# Patient Record
Sex: FEMALE | HISPANIC OR LATINO | Employment: UNEMPLOYED | URBAN - METROPOLITAN AREA
[De-identification: names, ages, dates, MRNs, and addresses within clinical notes are randomized per-mention and may not be internally consistent; named-entity substitution may affect disease eponyms.]

---

## 2017-02-16 PROBLEM — M54.2 NECK PAIN: Status: ACTIVE | Noted: 2017-02-16

## 2017-07-07 ENCOUNTER — OFFICE VISIT (OUTPATIENT)
Dept: OBSTETRICS AND GYNECOLOGY | Facility: CLINIC | Age: 57
End: 2017-07-07
Payer: MEDICAID

## 2017-07-07 VITALS — BODY MASS INDEX: 24.4 KG/M2 | DIASTOLIC BLOOD PRESSURE: 84 MMHG | WEIGHT: 146.63 LBS | SYSTOLIC BLOOD PRESSURE: 127 MMHG

## 2017-07-07 DIAGNOSIS — R31.9 HEMATURIA: Primary | ICD-10-CM

## 2017-07-07 PROCEDURE — 99213 OFFICE O/P EST LOW 20 MIN: CPT | Mod: PBBFAC,PO | Performed by: OBSTETRICS & GYNECOLOGY

## 2017-07-07 PROCEDURE — 88175 CYTOPATH C/V AUTO FLUID REDO: CPT

## 2017-07-07 PROCEDURE — 99999 PR PBB SHADOW E&M-EST. PATIENT-LVL III: CPT | Mod: PBBFAC,,, | Performed by: OBSTETRICS & GYNECOLOGY

## 2017-07-07 PROCEDURE — 87086 URINE CULTURE/COLONY COUNT: CPT

## 2017-07-07 PROCEDURE — 81003 URINALYSIS AUTO W/O SCOPE: CPT

## 2017-07-07 PROCEDURE — 99203 OFFICE O/P NEW LOW 30 MIN: CPT | Mod: S$GLB,,, | Performed by: OBSTETRICS & GYNECOLOGY

## 2017-07-08 LAB — BACTERIA UR CULT: NO GROWTH

## 2017-07-09 ENCOUNTER — TELEPHONE (OUTPATIENT)
Dept: OBSTETRICS AND GYNECOLOGY | Facility: CLINIC | Age: 57
End: 2017-07-09

## 2017-07-10 LAB
BILIRUB UR QL STRIP: NEGATIVE
CLARITY UR REFRACT.AUTO: CLEAR
COLOR UR AUTO: ABNORMAL
GLUCOSE UR QL STRIP: NEGATIVE
HGB UR QL STRIP: ABNORMAL
KETONES UR QL STRIP: NEGATIVE
LEUKOCYTE ESTERASE UR QL STRIP: NEGATIVE
NITRITE UR QL STRIP: NEGATIVE
PH UR STRIP: 5 [PH] (ref 5–8)
PROT UR QL STRIP: NEGATIVE
SP GR UR STRIP: 1.01 (ref 1–1.03)
URN SPEC COLLECT METH UR: ABNORMAL
UROBILINOGEN UR STRIP-ACNC: NEGATIVE EU/DL

## 2017-07-11 NOTE — PROGRESS NOTES
GYNECOLOGY  :  Lisa Do is a 56 y.o.No obstetric history on file.    Last visit to Gyn about 5 years ago   Here today for  Episode of bleeding during urination. Urine was bloody with clots , only one episode one month ago   No Pain or cramping  Unsure if was vaginal or from urinary tract  Hx of colon cancer in 2013 , s/p hemicolectomy and    Hx Hysterectomy x uterine fibroids.   Refers severe constipation , needs to strain to defecate              Past Medical History:   Diagnosis Date    Cancer 2013    Colon    GERD (gastroesophageal reflux disease)     Hypertension      Past Surgical History:   Procedure Laterality Date    APPENDECTOMY      COLON SURGERY  2013    Partial colectomy    HERNIA REPAIR      HYSTERECTOMY       Family History   Problem Relation Age of Onset    Hypertension Mother     Hypertension Sister     Cancer Sister      Brain    Cancer Sister      Breast     Social History   Substance Use Topics    Smoking status: Never Smoker    Smokeless tobacco: Never Used    Alcohol use No     OB History   No data available       /84   Wt 66.5 kg (146 lb 9.7 oz)   LMP 06/06/2017 (LMP Unknown)   BMI 24.40 kg/m²     Last PAP=s/p hyst  LMP =s/p hyst   Last Mammogram = n/a  Last Colonoscopy  =5 years ago       COMPREHENSIVE GYN HISTORY:       PAP History: Denies abnormal Paps.  Infection History: Denies STDs. Denies PID.      ROS  GENERAL: Denies significant weight gain or weight loss. Feeling well overall.  SKIN: Denies rash or lesions.  Normal skin turgor  HEAD: Denies head injury or headache.   NODES: Denies enlarged lymph nodes.   CHEST: Denies chest pain or shortness of breath.   CARDIOVASCULAR: Denies palpitations or left sided chest pain.   ABDOMEN: No abdominal pain,no  diarrhea,  nausea, vomiting or rectal bleeding. Constipation (+)  URINARY: normal  Frequency,no  Dysuria or burning on urination.   REPRODUCTIVE: Per HPI   BREASTS: The patient sometimes performs breast  self-examination and denies pain, lumps, or nipple discharge.   HEMATOLOGIC: No easy bruisability or excessive bleeding.   MUSCULOSKELETAL: Denies joint pain or swelling.   NEUROLOGIC: Denies syncope or weakness.   PSYCHIATRIC: Denies depression, anxiety or mood swings.    Physical Exam     Constitutional: She is oriented to person, place, and time. She appears well-developed and well-nourished. No distress.   HENT:   Head: Normocephalic.   NECK: Neck symmetric without masses or thyromegaly.  Cardiovascular: Normal rate and regular rhythm.   Pulmonary/Chest: Effort normal and breath sounds normal. No respiratory distress. She has no wheezes.   Breasts: Symmetrical, no skin changes or visible lesions. No palpable masses, nipple discharge or adenopathy bilaterally.  Abdominal: Soft not distended. Bowel sounds are normal. She exhibits   no masses . No tenderness to palpation.   Genitourinary: Pelvic exam was performed with patient supine.   External genitalia normal no lesions.Normal hair distribution   Adequate perineal body,normal urethral meatus   Vagina moist and well rugated without lesions, no vaginal  Discharge.  It appears to be a cervix  Visible in the vaginal vault, is flat,, structure resembles cervix   Bimanual exam is difficult to perform, abdomen is bloated, and patient does not help by pushing   Extremities normal no cyanosis ,edema.     Procedures:  Pap smear  TVUS   UA  Urine culture     A/P Lisa Hi 56 y.o. No obstetric history on file.    Dx=  1- Genital bleeding      Urethral caruncle   2- s/p hysterectomy ( supracervical ?)   3- hx colon cancer     Plan:  At this time no source of bleeding seen  from GYN tract.   We need to evaluate both Urinary tract and GI  With the hx of colon cancer and constipation, gastro evaluation is warranted  Urology consult placed for evaluation x possible cysto   Will follow imaging and cultures             Patient was counseled today on A.C.S. Pap guidelines  and recommendations for yearly pelvic exams, mammograms and monthly self breast exams; to see her PCP for other health maintenance, nutrition and weight gain counseling, discussed lab values.  Discussed colonoscopy recommendations every 10 years starting at age 50   Calcium and vit D daily intake     F/u in 1m to review labs and FU     Josiah Neely M.D.   OB/GYN

## 2017-07-16 ENCOUNTER — TELEPHONE (OUTPATIENT)
Dept: OBSTETRICS AND GYNECOLOGY | Facility: CLINIC | Age: 57
End: 2017-07-16

## 2017-07-17 NOTE — TELEPHONE ENCOUNTER
Uranalysis shows  Occult blood   Be sure patient has an appointment with urology   As recommended during the visit     Josiah Neely M.D.   OB/GYN    7/16/2017

## 2017-07-17 NOTE — TELEPHONE ENCOUNTER
Unable to reach pt. To tell her to f/u with urologists as discussed at her last visit with dr. Neely, her urine is showing blood.

## 2017-07-25 ENCOUNTER — OFFICE VISIT (OUTPATIENT)
Dept: UROLOGY | Facility: CLINIC | Age: 57
End: 2017-07-25
Payer: COMMERCIAL

## 2017-07-25 VITALS
DIASTOLIC BLOOD PRESSURE: 79 MMHG | HEART RATE: 78 BPM | WEIGHT: 146 LBS | BODY MASS INDEX: 24.32 KG/M2 | SYSTOLIC BLOOD PRESSURE: 117 MMHG | HEIGHT: 65 IN

## 2017-07-25 DIAGNOSIS — R31.0 GROSS HEMATURIA: Primary | ICD-10-CM

## 2017-07-25 DIAGNOSIS — N36.8 URETHRAL PROLAPSE: ICD-10-CM

## 2017-07-25 LAB
BACTERIA #/AREA URNS AUTO: NORMAL /HPF
BILIRUB SERPL-MCNC: NORMAL MG/DL
BLOOD URINE, POC: NORMAL
COLOR, POC UA: YELLOW
GLUCOSE UR QL STRIP: NORMAL
KETONES UR QL STRIP: NORMAL
LEUKOCYTE ESTERASE URINE, POC: NORMAL
MICROSCOPIC COMMENT: NORMAL
NITRITE, POC UA: NORMAL
PH, POC UA: 7
PROTEIN, POC: NORMAL
RBC #/AREA URNS AUTO: 0 /HPF (ref 0–4)
SPECIFIC GRAVITY, POC UA: 1
UROBILINOGEN, POC UA: NORMAL
WBC #/AREA URNS AUTO: 0 /HPF (ref 0–5)

## 2017-07-25 PROCEDURE — 87086 URINE CULTURE/COLONY COUNT: CPT

## 2017-07-25 PROCEDURE — 99204 OFFICE O/P NEW MOD 45 MIN: CPT | Mod: 25,S$GLB,, | Performed by: UROLOGY

## 2017-07-25 PROCEDURE — 81001 URINALYSIS AUTO W/SCOPE: CPT

## 2017-07-25 PROCEDURE — 99999 PR PBB SHADOW E&M-EST. PATIENT-LVL III: CPT | Mod: PBBFAC,,, | Performed by: UROLOGY

## 2017-07-25 PROCEDURE — 99213 OFFICE O/P EST LOW 20 MIN: CPT | Mod: PBBFAC,PO | Performed by: UROLOGY

## 2017-07-25 PROCEDURE — 81001 URINALYSIS AUTO W/SCOPE: CPT | Mod: S$GLB,,, | Performed by: UROLOGY

## 2017-07-25 RX ORDER — DOCUSATE SODIUM 100 MG/1
100 CAPSULE, LIQUID FILLED ORAL
COMMUNITY
Start: 2014-05-13 | End: 2017-07-25

## 2017-07-25 NOTE — PROGRESS NOTES
HPI:  Lisa Do is a 57 y.o. year old female that  presents with No chief complaint on file.  .  This patient referred by her gynecologist for gross hematuria.  No associated flank pain nausea vomiting or dysuria.  She gives no history of kidney stones.  This bleeding has since resolved.    The patient does have a history of colon cancer status post colon surgery in 2012.  She states that this is in remission and she is sure that the blood was from her urine and not from her rectum    Patient speaks minimal English and communication is done with the help of her son who translates.    She has never had urologic surgery and there is no family history of urologic cancer.  Graft chart review shows no from GYN this month showing gross hematuria with negative urine culture.  Physical exam showed urethral caruncle.  Vaginal exam showed no masses.  In January of this year GFR was 98.      Past Medical History:   Diagnosis Date    Cancer 2013    Colon    GERD (gastroesophageal reflux disease)     Hypertension      Social History     Social History    Marital status:      Spouse name: N/A    Number of children: N/A    Years of education: N/A     Occupational History    Not on file.     Social History Main Topics    Smoking status: Never Smoker    Smokeless tobacco: Never Used    Alcohol use No    Drug use: No    Sexual activity: Yes     Partners: Male     Other Topics Concern    Not on file     Social History Narrative    No narrative on file     Past Surgical History:   Procedure Laterality Date    APPENDECTOMY      COLON SURGERY  2013    Partial colectomy    HERNIA REPAIR      HYSTERECTOMY       Family History   Problem Relation Age of Onset    Hypertension Mother     Hypertension Sister     Cancer Sister      Brain    Cancer Sister      Breast           Review of Systems  The patient has no chest pains.  The patient has no shortness of breath  Patient wears        glasses.  All other  "review of systems are negative.      Physical Exam:  /79   Pulse 78   Ht 5' 5" (1.651 m)   Wt 66.2 kg (146 lb)   LMP 06/06/2017 (LMP Unknown)   BMI 24.30 kg/m²   General appearance: alert, cooperative, no distress  Constitutional:Oriented to person, place, and time.appears well-developed and well-nourished.   HEENT: Normocephalic, atraumatic, neck symmetrical, no nasal discharge   Eyes: conjunctivae/corneas clear, PERRL, EOM's intact  Lungs: clear to auscultation bilaterally, no dullness to percussion bilaterally  Heart: regular rate and rhythm without rub; no displacement of the PMI   Abdomen: soft, non-tender; bowel sounds normoactive; no organomegaly  :Urethral meatus without lesion, no urethral masses noted, minimal urethral prolapse, bladder nontender/nondistended, vagina normal appearing,   no      cystocele, anus/perineum without lesions, uterus surgically absent  Extremities: extremities symmetric; no clubbing, cyanosis, or edema  Integument: Skin color, texture, turgor normal; no rashes; hair distrubution normal  Neurologic: Alert and oriented X 3, normal strength, normal coordination and gait  Psychiatric: no pressured speech; normal affect; no evidence of impaired cognition     LABS:    Complete Blood Count  Lab Results   Component Value Date    RBC 5.07 04/27/2016    HGB 13.8 04/27/2016    HCT 43.2 04/27/2016    MCV 85.2 04/27/2016    MCH 27.3 04/27/2016    MCHC 32.0 04/27/2016    RDW 13.5 04/27/2016     04/27/2016    MPV 7.9 04/27/2016    LYMPH 1,903 04/27/2016    LYMPH 34.6 04/27/2016    MONO 330 04/27/2016    MONO 6.0 04/27/2016     04/27/2016    BASO 28 04/27/2016    EOSINOPHIL 2.4 04/27/2016    BASOPHIL 0.5 04/27/2016       Comprehensive Metabolic Panel  Lab Results   Component Value Date    GLU 97 02/14/2017    BUN 11 02/14/2017    CREATININE 0.66 02/14/2017     02/14/2017    K 3.9 02/14/2017    CL 98 02/14/2017    PROT 8.1 02/14/2017    ALBUMIN 4.8 02/14/2017    " BILITOT 0.5 02/14/2017    AST 24 02/14/2017    ALKPHOS 96 02/14/2017    CO2 27 02/14/2017    ALT 17 02/14/2017    EGFRNONAA 98 02/14/2017    ESTGFRAFRICA 117 04/27/2016       PSA  No results found for: PSA      Assessment:    ICD-10-CM ICD-9-CM    1. Gross hematuria R31.0 599.71 Urine culture      Urinalysis Microscopic      POCT urinalysis, dipstick or tablet reag      CT Urogram Abd Pelvis W WO      Cystoscopy   2. Urethral prolapse N36.8 599.5      The primary encounter diagnosis was Gross hematuria. A diagnosis of Urethral prolapse was also pertinent to this visit.      Plan: 1.  Gross hematuria.Plan.  I discussed at length in detail with the patient the need to evaluate blood in the urine.  I discussed that this is being done to rule out the presence of urothelial cancer.  I discussed the need for both imaging of the upper tracts and also cystoscopy.  Patient voices understanding and agrees.  We'll obtain CT urogram and subsequent cystoscopy    #2.  Urethral prolapse, mild.  Plan.  No therapy needed  Orders Placed This Encounter   Procedures    Cystoscopy    Urine culture    CT Urogram Abd Pelvis W WO    Urinalysis Microscopic    POCT urinalysis, dipstick or tablet reag           Dariusz Denney MD    PLEASE NOTE:  Please be advised that portions of this note were dictated using voice recognition software and may contain dictation related errors in spelling/grammar/appropriate pronouns/syntax or other errors that might have not been found and or corrected on text review.

## 2017-07-25 NOTE — LETTER
July 25, 2017      Josiah Neely MD  [address]  [place] [address]           [facility]  [address]  [place] [address]  Phone: [phone]          Patient: Lisa Do   MR Number: [id number]   YOB: 1960   Date of Visit: 7/25/2017       Dear Dr. Josiah Neely:    Thank you for referring Lisa Do to me for evaluation. Attached you will find relevant portions of my assessment and plan of care.    If you have questions, please do not hesitate to call me. I look forward to following Lisa Do along with you.    Sincerely,    Dariusz Denney MD    Enclosure  CC:  No Recipients    If you would like to receive this communication electronically, please contact [email or web address] or [phone] to request more information on [employer] access.    For providers and/or their staff who would like to refer a patient to [facility], please contact us through our one-stop-shop provider referral line, [facility], at [phone].    If you feel you have received this communication in error or would no longer like to receive these types of communications, please e-mail [email or web address]

## 2017-07-27 LAB
BACTERIA UR CULT: NORMAL
BACTERIA UR CULT: NORMAL

## 2017-08-08 ENCOUNTER — TELEPHONE (OUTPATIENT)
Dept: UROLOGY | Facility: CLINIC | Age: 57
End: 2017-08-08

## 2017-08-08 NOTE — TELEPHONE ENCOUNTER
----- Message from Cem Baltazar sent at 8/8/2017  8:42 AM CDT -----  Contact: [phone]/self   Pt need clarification on her upcoming appointments.  Please advise

## 2017-08-16 ENCOUNTER — PROCEDURE VISIT (OUTPATIENT)
Dept: UROLOGY | Facility: CLINIC | Age: 57
End: 2017-08-16
Payer: MEDICAID

## 2017-08-16 ENCOUNTER — HOSPITAL ENCOUNTER (OUTPATIENT)
Dept: RADIOLOGY | Facility: HOSPITAL | Age: 57
Discharge: HOME | End: 2017-08-16
Attending: UROLOGY
Payer: MEDICAID

## 2017-08-16 VITALS
DIASTOLIC BLOOD PRESSURE: 94 MMHG | WEIGHT: 146 LBS | HEART RATE: 82 BPM | SYSTOLIC BLOOD PRESSURE: 144 MMHG | HEIGHT: 65 IN | BODY MASS INDEX: 24.32 KG/M2

## 2017-08-16 DIAGNOSIS — R31.0 GROSS HEMATURIA: ICD-10-CM

## 2017-08-16 DIAGNOSIS — N28.1 RENAL CYST: ICD-10-CM

## 2017-08-16 DIAGNOSIS — R31.0 GROSS HEMATURIA: Primary | ICD-10-CM

## 2017-08-16 DIAGNOSIS — I10 ESSENTIAL HYPERTENSION, BENIGN: ICD-10-CM

## 2017-08-16 LAB
BILIRUB SERPL-MCNC: NORMAL MG/DL
BLOOD URINE, POC: NORMAL
COLOR, POC UA: NORMAL
GLUCOSE UR QL STRIP: NORMAL
KETONES UR QL STRIP: NORMAL
LEUKOCYTE ESTERASE URINE, POC: NORMAL
NITRITE, POC UA: NORMAL
PH, POC UA: 7
PROTEIN, POC: NORMAL
SPECIFIC GRAVITY, POC UA: 1
UROBILINOGEN, POC UA: NORMAL

## 2017-08-16 PROCEDURE — 74178 CT ABD&PLV WO CNTR FLWD CNTR: CPT | Mod: 26,,, | Performed by: RADIOLOGY

## 2017-08-16 PROCEDURE — 25500020 PHARM REV CODE 255: Performed by: UROLOGY

## 2017-08-16 PROCEDURE — 74178 CT ABD&PLV WO CNTR FLWD CNTR: CPT | Mod: TC

## 2017-08-16 PROCEDURE — 81001 URINALYSIS AUTO W/SCOPE: CPT | Mod: PBBFAC,PO | Performed by: UROLOGY

## 2017-08-16 PROCEDURE — 52000 CYSTOURETHROSCOPY: CPT | Mod: PBBFAC,PO | Performed by: UROLOGY

## 2017-08-16 PROCEDURE — 99213 OFFICE O/P EST LOW 20 MIN: CPT | Mod: S$PBB,25,, | Performed by: UROLOGY

## 2017-08-16 PROCEDURE — 52000 CYSTOURETHROSCOPY: CPT | Mod: S$PBB,,, | Performed by: UROLOGY

## 2017-08-16 RX ADMIN — IOHEXOL 125 ML: 350 INJECTION, SOLUTION INTRAVENOUS at 08:08

## 2017-08-17 NOTE — PROCEDURES
Procedures PLEASE NOTE:  Please be advised that portions of this note were dictated using voice recognition software and may contain dictation related errors in spelling/grammar/appropriate pronouns/syntax or other errors that might have not been found and or corrected on text review.      Procedures: Flexible cystourethroscopy    Pre Procedure Diagnosis: Gross hematuria    Post Procedure Diagnosis: Same of presumed benign etiology    Date of Procedure. 08/16/2017    Indications.  Female with gross hematuria presents for evaluation.  CT scan today shows no evidence of urologic malignancy.  He'll cyst is noted.  Recent urine culture was negative    Surgeon: Dariusz Denney MD    Anesthesia: 2% uro-jet lidocaine jelly for local analgesia    Flexible cysto-urethroscopy was performed after consent was obtained.    2% lidocaine urojet was used for local analgesia.  The genitalia were prepped and draped in the usual sterile fashion.    The flexible scope was advanced into the urethra and into the bladder.  Bilateral ureteral orifices were evaluated and noted to be normal with clear efflux.  The bladder was completely surveyed in a systematic fashion.   No bladder tumors or lesions were seen.    The patient tolerated the procedure well without complication.    Impression: Gross hematuria of presumed benign etiology.    Plan.  This completes necessary evaluation on this patient.  No further evaluation needed unless patient develops recurrent gross hematuria    #2.   Elevated blood pressure.  Plan.  This finding pointed out to the patient.  I recommend that the patient follow up with the patient's PCP for this.    #3.Renal cyst.  I discussed the benign nature of renal cysts and that no intervention and no further evaluation is needed.    Physical exam reveals reveals a well-developed well-nourished patient  in no acute distress.  Patient is alert and oriented ×3 with normal mood and affect.  Respiratory effort is normal  "and there is no peripheral edema.  Skin is normal to inspection and palpation.  Patient has normal female external genitalia.  Urethra normal.  No lesions and perineum.    BP (!) 144/94   Pulse 82   Ht 5' 5" (1.651 m)   Wt 66.2 kg (146 lb)   BMI 24.30 kg/m²   Review of Systems  General ROS: negative for chills, fever or weight loss  Respiratory ROS: no cough, shortness of breath, or wheezing  Cardiovascular ROS: no chest pain or dyspnea on exertion  Musculoskeletal ROS: negative for gait disturbance or muscular weakness    Family History   Problem Relation Age of Onset    Hypertension Mother     Hypertension Sister     Cancer Sister      Brain    Cancer Sister      Breast     Past Medical History:   Diagnosis Date    Cancer 2013    Colon    GERD (gastroesophageal reflux disease)     Hypertension      Family History   Problem Relation Age of Onset    Hypertension Mother     Hypertension Sister     Cancer Sister      Brain    Cancer Sister      Breast     Social History   Substance Use Topics    Smoking status: Never Smoker    Smokeless tobacco: Never Used    Alcohol use No       Impression:      ICD-10-CM ICD-9-CM    1. Gross hematuria R31.0 599.71 Cystoscopy      POCT urinalysis, dipstick or tablet reag   2. Essential hypertension, benign I10 401.1    3. Renal cyst N28.1 753.10              "

## 2018-10-22 ENCOUNTER — TELEPHONE (OUTPATIENT)
Dept: OBSTETRICS AND GYNECOLOGY | Facility: CLINIC | Age: 58
End: 2018-10-22

## 2018-10-22 NOTE — TELEPHONE ENCOUNTER
----- Message from Savannah Shook sent at 10/22/2018  4:09 PM CDT -----  Contact: self, [phone]  Czech speaking only patient states she feels her stomach inflamed and would like to be seen as soon as possible. Please advise.

## 2018-10-23 NOTE — TELEPHONE ENCOUNTER
Patient c/o pain on her RLQ. States she would like to come in for her annual exam to have a yearly check up.

## 2018-10-26 ENCOUNTER — OFFICE VISIT (OUTPATIENT)
Dept: OBSTETRICS AND GYNECOLOGY | Facility: CLINIC | Age: 58
End: 2018-10-26
Payer: MEDICAID

## 2018-10-26 VITALS
BODY MASS INDEX: 25.27 KG/M2 | WEIGHT: 151.69 LBS | HEIGHT: 65 IN | DIASTOLIC BLOOD PRESSURE: 78 MMHG | SYSTOLIC BLOOD PRESSURE: 132 MMHG

## 2018-10-26 DIAGNOSIS — K59.00 CONSTIPATION, UNSPECIFIED CONSTIPATION TYPE: ICD-10-CM

## 2018-10-26 DIAGNOSIS — R10.31 RIGHT LOWER QUADRANT ABDOMINAL PAIN: Primary | ICD-10-CM

## 2018-10-26 PROCEDURE — 99213 OFFICE O/P EST LOW 20 MIN: CPT | Mod: S$PBB,,, | Performed by: OBSTETRICS & GYNECOLOGY

## 2018-10-26 PROCEDURE — 99999 PR PBB SHADOW E&M-EST. PATIENT-LVL III: CPT | Mod: PBBFAC,,, | Performed by: OBSTETRICS & GYNECOLOGY

## 2018-10-26 PROCEDURE — 99213 OFFICE O/P EST LOW 20 MIN: CPT | Mod: PBBFAC,PO | Performed by: OBSTETRICS & GYNECOLOGY

## 2018-10-26 RX ORDER — HYDROCHLOROTHIAZIDE 25 MG/1
25 TABLET ORAL DAILY
COMMUNITY

## 2018-10-26 NOTE — PROGRESS NOTES
"GYNECOLOGY  :  Lisa Do is a 58 y.o.No obstetric history on file.    Here today for persistent  RLQ pain and tenderness  Constipation is still a problem, but wanted to be checked   Saw Urology as recommended for hematuria  , s/p cystoscopy   And also was seen by Gastroenterology   For pain and constipation      Hx of colon cancer in  , s/p hemicolectomy and    Hx Hysterectomy x uterine fibroids. (supracervical, pt has a cervix)   Refers severe constipation , needs to strain to defecate              Past Medical History:   Diagnosis Date    Cancer 2013    Colon    GERD (gastroesophageal reflux disease)     Hypertension      Past Surgical History:   Procedure Laterality Date    APPENDECTOMY      COLON SURGERY  2013    Partial colectomy    HERNIA REPAIR      HYSTERECTOMY      TUBAL LIGATION       Family History   Problem Relation Age of Onset    Hypertension Mother     Hypertension Sister     Cancer Sister         Brain    Cancer Sister         Breast     Social History     Tobacco Use    Smoking status: Never Smoker    Smokeless tobacco: Never Used   Substance Use Topics    Alcohol use: No    Drug use: No     OB History    Para Term  AB Living   5 4 4   1 4   SAB TAB Ectopic Multiple Live Births   1       4      # Outcome Date GA Lbr Sundeep/2nd Weight Sex Delivery Anes PTL Lv   5 SAB            4 Term  40w0d    Vag-Spont   GRICEL   3 Term  40w0d    Vag-Spont   GRICEL   2 Term  40w0d    Vag-Spont   GRICEL   1 Term  40w0d    Vag-Spont   GRICEL          /78   Ht 5' 5" (1.651 m)   Wt 68.8 kg (151 lb 10.8 oz)   LMP 2017 (LMP Unknown)   BMI 25.24 kg/m²     Last PAP=s/p hyst  LMP =s/p hyst   Last Mammogram = n/a  Last Colonoscopy  =5 years ago       COMPREHENSIVE GYN HISTORY:       PAP History: Denies abnormal Paps.  Infection History: Denies STDs. Denies PID.      ROS  GENERAL: Denies significant weight gain or weight loss. Feeling well overall.  SKIN: Denies rash or lesions.  " Normal skin turgor  HEAD: Denies head injury or headache.   NODES: Denies enlarged lymph nodes.   CHEST: Denies chest pain or shortness of breath.   CARDIOVASCULAR: Denies palpitations or left sided chest pain.   ABDOMEN: No abdominal pain,no  diarrhea,  nausea, vomiting or rectal bleeding. Constipation (+)  URINARY: normal  Frequency,no  Dysuria or burning on urination.   REPRODUCTIVE: Per HPI   BREASTS: The patient sometimes performs breast self-examination and denies pain, lumps, or nipple discharge.   HEMATOLOGIC: No easy bruisability or excessive bleeding.   MUSCULOSKELETAL: Denies joint pain or swelling.   NEUROLOGIC: Denies syncope or weakness.   PSYCHIATRIC: Denies depression, anxiety or mood swings.    Physical Exam     Constitutional: She is oriented to person, place, and time. She appears well-developed and well-nourished. No distress.   HENT:   Head: Normocephalic.   NECK: Neck symmetric without masses or thyromegaly.  Cardiovascular: Normal rate and regular rhythm.   Pulmonary/Chest: Effort normal and breath sounds normal. No respiratory distress. She has no wheezes.   Breasts: Symmetrical, no skin changes or visible lesions. No palpable masses, nipple discharge or adenopathy bilaterally.  Abdominal: Soft not distended. Bowel sounds are normal. She exhibits   no masses . Mild  tenderness to palpation on all quadrants , mostly RLQ   .   Genitourinary: Pelvic exam was performed with patient supine.   External genitalia normal no lesions.Normal hair distribution   Adequate perineal body,normal urethral meatus   Vagina moist and well rugated without lesions, no vaginal  Discharge.   cervix  Visible in the vaginal vault, is flat,, structure resembles cervix   Bimanual exam is difficult to perform, but no masses   Extremities normal no cyanosis ,edema.     Procedures:  TVUS       A/P Lisa Hi 58 y.o. No obstetric history on file.    Dx=  1- Abdominal pain   2    Urethral prolapse   3- Constipation    2- s/p hysterectomy ( supracervical )   3- hx colon cancer     Plan:  Will follow TVUS to evaluate ovaries a and rule out Gyn origin of patients symptoms   Will see her GI doctor ( [facility] )  For constipation that appears to be an important contributor   To the patients abdominal pain              Patient was counseled today on A.C.S. Pap guidelines and recommendations for yearly pelvic exams, mammograms and monthly self breast exams; to see her PCP for other health maintenance, nutrition and weight gain counseling, discussed lab values.  Discussed colonoscopy recommendations every 10 years starting at age 50   Calcium and vit D daily intake     F/u in 1m to review TVUS     Josiah Neely M.D.   OB/GYN

## 2018-10-31 DIAGNOSIS — R10.13 ABDOMINAL PAIN, EPIGASTRIC: Primary | ICD-10-CM

## 2018-11-09 ENCOUNTER — HOSPITAL ENCOUNTER (OUTPATIENT)
Dept: RADIOLOGY | Facility: HOSPITAL | Age: 58
Discharge: HOME | End: 2018-11-09
Attending: INTERNAL MEDICINE
Payer: MEDICAID

## 2018-11-09 DIAGNOSIS — R10.13 ABDOMINAL PAIN, EPIGASTRIC: ICD-10-CM

## 2018-11-09 PROCEDURE — 76700 US EXAM ABDOM COMPLETE: CPT | Mod: 26,,, | Performed by: RADIOLOGY

## 2018-11-09 PROCEDURE — 76700 US EXAM ABDOM COMPLETE: CPT | Mod: TC

## 2019-09-11 ENCOUNTER — OFFICE VISIT (OUTPATIENT)
Dept: OBSTETRICS AND GYNECOLOGY | Facility: CLINIC | Age: 59
End: 2019-09-11
Payer: MEDICAID

## 2019-09-11 VITALS
BODY MASS INDEX: 25.68 KG/M2 | DIASTOLIC BLOOD PRESSURE: 85 MMHG | SYSTOLIC BLOOD PRESSURE: 128 MMHG | HEIGHT: 65 IN | WEIGHT: 154.13 LBS

## 2019-09-11 DIAGNOSIS — N39.0 URINARY TRACT INFECTION WITHOUT HEMATURIA, SITE UNSPECIFIED: ICD-10-CM

## 2019-09-11 DIAGNOSIS — R10.2 PELVIC PAIN: Primary | ICD-10-CM

## 2019-09-11 PROCEDURE — 87086 URINE CULTURE/COLONY COUNT: CPT

## 2019-09-11 PROCEDURE — 99213 OFFICE O/P EST LOW 20 MIN: CPT | Mod: PBBFAC,PO | Performed by: OBSTETRICS & GYNECOLOGY

## 2019-09-11 PROCEDURE — 99999 PR PBB SHADOW E&M-EST. PATIENT-LVL III: CPT | Mod: PBBFAC,,, | Performed by: OBSTETRICS & GYNECOLOGY

## 2019-09-11 PROCEDURE — 99213 OFFICE O/P EST LOW 20 MIN: CPT | Mod: S$PBB,,, | Performed by: OBSTETRICS & GYNECOLOGY

## 2019-09-11 PROCEDURE — 99213 PR OFFICE/OUTPT VISIT, EST, LEVL III, 20-29 MIN: ICD-10-PCS | Mod: S$PBB,,, | Performed by: OBSTETRICS & GYNECOLOGY

## 2019-09-11 PROCEDURE — 99999 PR PBB SHADOW E&M-EST. PATIENT-LVL III: ICD-10-PCS | Mod: PBBFAC,,, | Performed by: OBSTETRICS & GYNECOLOGY

## 2019-09-14 LAB — BACTERIA UR CULT: NORMAL

## 2019-09-15 NOTE — PROGRESS NOTES
"GYNECOLOGY  :  Lisa Do is a 59 y.o.No obstetric history on file.    Here today for follow up   Diffuse and intermittent abdominal pain , associated with constipation,bloating   Seen before x similar symptoms in the past     Constipation is still a problem, but wanted to be checked   Saw Urology as recommended for hematuria  , s/p cystoscopy   And also was seen by Gastroenterology   For pain and constipation      Hx of colon cancer in 2013 , s/p hemicolectomy and    Hx Hysterectomy x uterine fibroids. (supracervical, pt has a cervix)   Refers severe constipation , needs to strain to defecate              Past Medical History:   Diagnosis Date    Cancer 2013    Colon    GERD (gastroesophageal reflux disease)     Hypertension      Past Surgical History:   Procedure Laterality Date    APPENDECTOMY      COLON SURGERY  2013    Partial colectomy    HERNIA REPAIR      HYSTERECTOMY      TUBAL LIGATION       Family History   Problem Relation Age of Onset    Hypertension Mother     Hypertension Sister     Cancer Sister         Brain    Cancer Sister         Breast     Social History     Tobacco Use    Smoking status: Never Smoker    Smokeless tobacco: Never Used   Substance Use Topics    Alcohol use: No    Drug use: No     OB History    Para Term  AB Living   5 4 4   1 4   SAB TAB Ectopic Multiple Live Births   1       4      # Outcome Date GA Lbr Sundeep/2nd Weight Sex Delivery Anes PTL Lv   5 SAB            4 Term  40w0d    Vag-Spont   GRICEL   3 Term  40w0d    Vag-Spont   GRICEL   2 Term  40w0d    Vag-Spont   GRICEL   1 Term  40w0d    Vag-Spont   GRICEL       /85   Ht 5' 5" (1.651 m)   Wt 69.9 kg (154 lb 1.6 oz)   LMP 2017 (LMP Unknown)   BMI 25.64 kg/m²     Last PAP=s/p hyst  LMP =s/p hyst   Last Mammogram = n/a  Last Colonoscopy  =6 years ago       COMPREHENSIVE GYN HISTORY:       PAP History: Denies abnormal Paps.  Infection History: Denies STDs. Denies PID.      ROS  GENERAL: " Denies significant weight gain or weight loss. Feeling well overall.  SKIN: Denies rash or lesions.  Normal skin turgor  HEAD: Denies head injury or headache.   NODES: Denies enlarged lymph nodes.   CHEST: Denies chest pain or shortness of breath.   CARDIOVASCULAR: Denies palpitations or left sided chest pain.   ABDOMEN: No abdominal pain,no  diarrhea,  nausea, vomiting or rectal bleeding. Constipation (+)  URINARY: normal  Frequency,no  Dysuria or burning on urination.   REPRODUCTIVE: Per HPI   BREASTS: The patient sometimes performs breast self-examination and denies pain, lumps, or nipple discharge.   HEMATOLOGIC: No easy bruisability or excessive bleeding.   MUSCULOSKELETAL: Denies joint pain or swelling.   NEUROLOGIC: Denies syncope or weakness.   PSYCHIATRIC: Denies depression, anxiety or mood swings.    Physical Exam     Constitutional: She is oriented to person, place, and time. She appears well-developed and well-nourished. No distress.   HENT:   Head: Normocephalic.   NECK: Neck symmetric without masses or thyromegaly.  Cardiovascular: Normal rate and regular rhythm.   Pulmonary/Chest: Effort normal and breath sounds normal. No respiratory distress. She has no wheezes.   Breasts: Symmetrical, no skin changes or visible lesions. No palpable masses, nipple discharge or adenopathy bilaterally.  Abdominal: Soft not distended. Bowel sounds are normal. She exhibits   no masses . Mild  tenderness to palpation on all quadrants , mostly RLQ   .   Genitourinary: Pelvic exam was performed with patient supine.   External genitalia normal no lesions.Normal hair distribution   Adequate perineal body,normal urethral meatus   Vagina moist and well rugated without lesions, no vaginal  Discharge.   cervix  Visible in the vaginal vault, is flat,, structure resembles cervix   Bimanual exam is difficult to perform, but no masses   Extremities normal no cyanosis ,edema.     Procedures:  TVUS   Urine culture       A/P Lsia  Hi 59 y.o. No obstetric history on file.    Dx=  1- Abdominal pain   2    Urethral prolapse   3- Constipation   2- s/p hysterectomy ( supracervical )   3- hx colon cancer     Plan:  Will follow TVUS to evaluate ovaries a and rule out Gyn origin of patients symptoms   To  see her GI doctor ( [facility] )  For constipation that appears to be an important contributor   To the patients abdominal pain              Patient was counseled today on A.C.S. Pap guidelines and recommendations for yearly pelvic exams, mammograms and monthly self breast exams; to see her PCP for other health maintenance, nutrition and weight gain counseling, discussed lab values.  Discussed colonoscopy recommendations every 10 years starting at age 50   Calcium and vit D daily intake     F/u in 1m to review TVUS     Josiah Neely M.D.   OB/GYN

## 2019-09-17 ENCOUNTER — TELEPHONE (OUTPATIENT)
Dept: OBSTETRICS AND GYNECOLOGY | Facility: HOSPITAL | Age: 59
End: 2019-09-17

## 2019-09-17 NOTE — TELEPHONE ENCOUNTER
Called and informed patient of results (Urine culture is negative) patient agreed and verbalized understanding.

## 2019-10-02 ENCOUNTER — TELEPHONE (OUTPATIENT)
Dept: OBSTETRICS AND GYNECOLOGY | Facility: CLINIC | Age: 59
End: 2019-10-02

## 2019-10-02 ENCOUNTER — PROCEDURE VISIT (OUTPATIENT)
Dept: OBSTETRICS AND GYNECOLOGY | Facility: CLINIC | Age: 59
End: 2019-10-02
Payer: MEDICAID

## 2019-10-02 ENCOUNTER — OFFICE VISIT (OUTPATIENT)
Dept: OBSTETRICS AND GYNECOLOGY | Facility: CLINIC | Age: 59
End: 2019-10-02
Payer: MEDICAID

## 2019-10-02 VITALS
BODY MASS INDEX: 25.42 KG/M2 | HEIGHT: 65 IN | WEIGHT: 152.56 LBS | SYSTOLIC BLOOD PRESSURE: 110 MMHG | DIASTOLIC BLOOD PRESSURE: 74 MMHG

## 2019-10-02 DIAGNOSIS — K59.00 CONSTIPATION, UNSPECIFIED CONSTIPATION TYPE: ICD-10-CM

## 2019-10-02 DIAGNOSIS — R10.2 PELVIC PAIN: ICD-10-CM

## 2019-10-02 DIAGNOSIS — Z12.39 BREAST CANCER SCREENING: Primary | ICD-10-CM

## 2019-10-02 DIAGNOSIS — Z85.038 HISTORY OF COLON CANCER: ICD-10-CM

## 2019-10-02 PROCEDURE — 99999 PR PBB SHADOW E&M-EST. PATIENT-LVL III: ICD-10-PCS | Mod: PBBFAC,,, | Performed by: OBSTETRICS & GYNECOLOGY

## 2019-10-02 PROCEDURE — 99213 OFFICE O/P EST LOW 20 MIN: CPT | Mod: S$PBB,25,, | Performed by: OBSTETRICS & GYNECOLOGY

## 2019-10-02 PROCEDURE — 76830 TRANSVAGINAL US NON-OB: CPT | Mod: PBBFAC,PO

## 2019-10-02 PROCEDURE — 76830 TRANSVAGINAL US NON-OB: CPT | Mod: 26,S$PBB,, | Performed by: OBSTETRICS & GYNECOLOGY

## 2019-10-02 PROCEDURE — 99999 PR PBB SHADOW E&M-EST. PATIENT-LVL III: CPT | Mod: PBBFAC,,, | Performed by: OBSTETRICS & GYNECOLOGY

## 2019-10-02 PROCEDURE — 76830 PR  ECHOGRAPHY,TRANSVAGINAL: ICD-10-PCS | Mod: 26,S$PBB,, | Performed by: OBSTETRICS & GYNECOLOGY

## 2019-10-02 PROCEDURE — 99213 PR OFFICE/OUTPT VISIT, EST, LEVL III, 20-29 MIN: ICD-10-PCS | Mod: S$PBB,25,, | Performed by: OBSTETRICS & GYNECOLOGY

## 2019-10-02 PROCEDURE — 99213 OFFICE O/P EST LOW 20 MIN: CPT | Mod: PBBFAC,PO,25 | Performed by: OBSTETRICS & GYNECOLOGY

## 2019-10-02 NOTE — TELEPHONE ENCOUNTER
Patient informed that mammogram orders are in epic if she should want to scheduled that at her earliest convenience to call the [facility]. Patient states that she goes to a place in the [facility] since it is closer to her. I told patient if she could try to get their name for me I will gladly fax them the order. Patient verbalized understanding and will call me back with the name of the place.

## 2019-10-02 NOTE — PROGRESS NOTES
"GYNECOLOGY  :  Lisa Do is a 59 y.o.No obstetric history on file.    Here today for follow up  On pelvic US     TVUS 10/2/19   Absent uterus, ovaries not seen   Severe constipation, abundant stool seen in pelvis      Seen before due to   Diffuse and intermittent abdominal pain , associated with constipation,bloating   Seen before x similar symptoms in the past     Constipation always a problem, but wanted to be checked   Saw Urology as recommended for hematuria  , s/p cystoscopy   And also was seen by Gastroenterology   For pain and constipation      Hx of colon cancer in  , s/p hemicolectomy and    Hx Hysterectomy x uterine fibroids. (supracervical, pt has a cervix)   Refers severe constipation , needs to strain hard to defecate              Past Medical History:   Diagnosis Date    Cancer 2013    Colon    GERD (gastroesophageal reflux disease)     Hypertension      Past Surgical History:   Procedure Laterality Date    APPENDECTOMY      COLON SURGERY      Partial colectomy    HERNIA REPAIR      HYSTERECTOMY      TUBAL LIGATION       Family History   Problem Relation Age of Onset    Hypertension Mother     Hypertension Sister     Cancer Sister         Brain    Cancer Sister         Breast     Social History     Tobacco Use    Smoking status: Never Smoker    Smokeless tobacco: Never Used   Substance Use Topics    Alcohol use: No    Drug use: No     OB History    Para Term  AB Living   5 4 4   1 4   SAB TAB Ectopic Multiple Live Births   1       4      # Outcome Date GA Lbr Sundeep/2nd Weight Sex Delivery Anes PTL Lv   5 SAB            4 Term  40w0d    Vag-Spont   GRICEL   3 Term  40w0d    Vag-Spont   GRICEL   2 Term  40w0d    Vag-Spont   GRICEL   1 Term  40w0d    Vag-Spont   GRICEL       /74 (BP Location: Right arm)   Ht 5' 5" (1.651 m)   Wt 69.2 kg (152 lb 8.9 oz)   LMP 2017 (LMP Unknown)   BMI 25.39 kg/m²     Last PAP=s/p hyst  LMP =s/p hyst   Last Mammogram = " n/a  Last Colonoscopy  =6 years ago       COMPREHENSIVE GYN HISTORY:       PAP History: Denies abnormal Paps.  Infection History: Denies STDs. Denies PID.      ROS  GENERAL: Denies significant weight gain or weight loss. Feeling well overall.  SKIN: Denies rash or lesions.  Normal skin turgor  HEAD: Denies head injury or headache.   NODES: Denies enlarged lymph nodes.   CHEST: Denies chest pain or shortness of breath.   CARDIOVASCULAR: Denies palpitations or left sided chest pain.   ABDOMEN: No abdominal pain,no  diarrhea,  nausea, vomiting or rectal bleeding. Constipation (+)  URINARY: normal  Frequency,no  Dysuria or burning on urination.   REPRODUCTIVE: Per HPI   BREASTS: The patient sometimes performs breast self-examination and denies pain, lumps, or nipple discharge.   HEMATOLOGIC: No easy bruisability or excessive bleeding.   MUSCULOSKELETAL: Denies joint pain or swelling.   NEUROLOGIC: Denies syncope or weakness.   PSYCHIATRIC: Denies depression, anxiety or mood swings.    Physical Exam     Constitutional: She is oriented to person, place, and time. She appears well-developed and well-nourished. No distress.   HENT:   Head: Normocephalic.   NECK: Neck symmetric without masses or thyromegaly.  Cardiovascular: Normal rate and regular rhythm.   Pulmonary/Chest: Effort normal and breath sounds normal. No respiratory distress. She has no wheezes.   Breasts: Symmetrical, no skin changes or visible lesions. No palpable masses, nipple discharge or adenopathy bilaterally.  Abdominal: Soft not distended. Bowel sounds are normal. She exhibits   no masses . Mild  tenderness to palpation on all quadrants , mostly RLQ   .   Genitourinary: Pelvic exam was performed with patient supine.   External genitalia normal no lesions.Normal hair distribution   Adequate perineal body,normal urethral meatus   Vagina moist and well rugated without lesions, no vaginal  Discharge.   cervix  Visible in the vaginal vault, is flat,,  structure resembles cervix   Bimanual exam is difficult to perform, but no masses   Extremities normal no cyanosis ,edema.     Procedures:  TVUS   Urine culture       A/P Lisa Do 59 y.o. No obstetric history on file.    Dx=  1- Abdominal pain   2    Urethral prolapse   3- Constipation   2- s/p hysterectomy ( supracervical )   3- hx colon cancer     Plan:  US only shows stool l due to constipation   To  see her GI doctor ( [facility] )  For constipation that appears to be an important contributor   To the patients abdominal pain .  Recommended Miral[facility]  GI evaluation and PCP Dr Patterson for other health issues   Mammogram order placed in [facility]                Patient was counseled today on A.C.S. Pap guidelines and recommendations for yearly pelvic exams, mammograms and monthly self breast exams; to see her PCP for other health maintenance, nutrition and weight gain counseling, discussed lab values.  Discussed colonoscopy recommendations every 10 years starting at age 50   Calcium and vit D daily intake     F/u in 1 year or PRN     Josiah Neely M.D.   OB/GYN

## 2019-10-21 ENCOUNTER — TELEPHONE (OUTPATIENT)
Dept: FAMILY MEDICINE | Facility: CLINIC | Age: 59
End: 2019-10-21

## 2019-10-21 NOTE — TELEPHONE ENCOUNTER
Spoke w/ patient. Dr. Maldonado does not have any availability. Verbalized understanding        ----- Message from Marlena Barbour sent at 10/21/2019 10:34 AM CDT -----  Contact: [phone]/self  Patient is requesting to speak with you to establish care. She is suffering severe left pain. Please advise.

## 2020-09-25 ENCOUNTER — HOSPITAL ENCOUNTER (EMERGENCY)
Facility: HOSPITAL | Age: 60
Discharge: HOME | End: 2020-09-26
Attending: EMERGENCY MEDICINE
Payer: MEDICAID

## 2020-09-25 DIAGNOSIS — R07.9 CHEST PAIN: ICD-10-CM

## 2020-09-25 LAB
ALBUMIN SERPL BCP-MCNC: 4.6 G/DL (ref 3.5–5.2)
ALP SERPL-CCNC: 109 U/L (ref 55–135)
ALT SERPL W/O P-5'-P-CCNC: 16 U/L (ref 10–44)
ANION GAP SERPL CALC-SCNC: 16 MMOL/L (ref 8–16)
AST SERPL-CCNC: 22 U/L (ref 10–40)
BASOPHILS # BLD AUTO: 0.05 K/UL (ref 0–0.2)
BASOPHILS NFR BLD: 0.4 % (ref 0–1.9)
BILIRUB SERPL-MCNC: 0.4 MG/DL (ref 0.1–1)
BNP SERPL-MCNC: 17 PG/ML (ref 0–99)
BUN SERPL-MCNC: 11 MG/DL (ref 8–23)
CALCIUM SERPL-MCNC: 9.9 MG/DL (ref 8.7–10.5)
CHLORIDE SERPL-SCNC: 98 MMOL/L (ref 95–110)
CO2 SERPL-SCNC: 25 MMOL/L (ref 23–29)
CREAT SERPL-MCNC: 0.7 MG/DL (ref 0.5–1.4)
D DIMER PPP IA.FEU-MCNC: 0.77 MG/L FEU
DIFFERENTIAL METHOD: ABNORMAL
EOSINOPHIL # BLD AUTO: 0.2 K/UL (ref 0–0.5)
EOSINOPHIL NFR BLD: 1.4 % (ref 0–8)
ERYTHROCYTE [DISTWIDTH] IN BLOOD BY AUTOMATED COUNT: 12.3 % (ref 11.5–14.5)
EST. GFR  (AFRICAN AMERICAN): >60 ML/MIN/1.73 M^2
EST. GFR  (NON AFRICAN AMERICAN): >60 ML/MIN/1.73 M^2
GLUCOSE SERPL-MCNC: 138 MG/DL (ref 70–110)
HCT VFR BLD AUTO: 45.8 % (ref 37–48.5)
HGB BLD-MCNC: 15.4 G/DL (ref 12–16)
IMM GRANULOCYTES # BLD AUTO: 0.06 K/UL (ref 0–0.04)
IMM GRANULOCYTES NFR BLD AUTO: 0.4 % (ref 0–0.5)
LYMPHOCYTES # BLD AUTO: 1.7 K/UL (ref 1–4.8)
LYMPHOCYTES NFR BLD: 12.9 % (ref 18–48)
MAGNESIUM SERPL-MCNC: 1.8 MG/DL (ref 1.6–2.6)
MCH RBC QN AUTO: 27.5 PG (ref 27–31)
MCHC RBC AUTO-ENTMCNC: 33.6 G/DL (ref 32–36)
MCV RBC AUTO: 82 FL (ref 82–98)
MONOCYTES # BLD AUTO: 0.6 K/UL (ref 0.3–1)
MONOCYTES NFR BLD: 4.4 % (ref 4–15)
NEUTROPHILS # BLD AUTO: 10.9 K/UL (ref 1.8–7.7)
NEUTROPHILS NFR BLD: 80.5 % (ref 38–73)
NRBC BLD-RTO: 0 /100 WBC
PLATELET # BLD AUTO: 331 K/UL (ref 150–350)
PMV BLD AUTO: 9.1 FL (ref 9.2–12.9)
POTASSIUM SERPL-SCNC: 3.1 MMOL/L (ref 3.5–5.1)
PROT SERPL-MCNC: 8.4 G/DL (ref 6–8.4)
RBC # BLD AUTO: 5.59 M/UL (ref 4–5.4)
SODIUM SERPL-SCNC: 139 MMOL/L (ref 136–145)
TROPONIN I SERPL DL<=0.01 NG/ML-MCNC: <0.006 NG/ML (ref 0–0.03)
WBC # BLD AUTO: 13.53 K/UL (ref 3.9–12.7)

## 2020-09-25 PROCEDURE — 99285 EMERGENCY DEPT VISIT HI MDM: CPT | Mod: 25

## 2020-09-25 PROCEDURE — 83735 ASSAY OF MAGNESIUM: CPT

## 2020-09-25 PROCEDURE — 84484 ASSAY OF TROPONIN QUANT: CPT

## 2020-09-25 PROCEDURE — 85025 COMPLETE CBC W/AUTO DIFF WBC: CPT

## 2020-09-25 PROCEDURE — 25000003 PHARM REV CODE 250: Performed by: EMERGENCY MEDICINE

## 2020-09-25 PROCEDURE — 93010 EKG 12-LEAD: ICD-10-PCS | Mod: ,,, | Performed by: INTERNAL MEDICINE

## 2020-09-25 PROCEDURE — 85379 FIBRIN DEGRADATION QUANT: CPT

## 2020-09-25 PROCEDURE — 93010 ELECTROCARDIOGRAM REPORT: CPT | Mod: ,,, | Performed by: INTERNAL MEDICINE

## 2020-09-25 PROCEDURE — 83880 ASSAY OF NATRIURETIC PEPTIDE: CPT

## 2020-09-25 PROCEDURE — 80053 COMPREHEN METABOLIC PANEL: CPT

## 2020-09-25 PROCEDURE — 93005 ELECTROCARDIOGRAM TRACING: CPT

## 2020-09-25 RX ORDER — CETIRIZINE HYDROCHLORIDE 10 MG/1
10 TABLET ORAL DAILY
COMMUNITY

## 2020-09-25 RX ORDER — DOXYCYCLINE 100 MG/1
100 CAPSULE ORAL 2 TIMES DAILY
COMMUNITY

## 2020-09-25 RX ORDER — ASPIRIN 325 MG
325 TABLET ORAL
Status: COMPLETED | OUTPATIENT
Start: 2020-09-25 | End: 2020-09-25

## 2020-09-25 RX ORDER — POTASSIUM CHLORIDE 1.5 G/1.58G
60 POWDER, FOR SOLUTION ORAL
Status: COMPLETED | OUTPATIENT
Start: 2020-09-25 | End: 2020-09-25

## 2020-09-25 RX ADMIN — LIDOCAINE HYDROCHLORIDE: 20 SOLUTION ORAL; TOPICAL at 09:09

## 2020-09-25 RX ADMIN — IOHEXOL 80 ML: 350 INJECTION, SOLUTION INTRAVENOUS at 11:09

## 2020-09-25 RX ADMIN — ASPIRIN 325 MG ORAL TABLET 325 MG: 325 PILL ORAL at 08:09

## 2020-09-25 RX ADMIN — POTASSIUM CHLORIDE 60 MEQ: 1.5 POWDER, FOR SOLUTION ORAL at 09:09

## 2020-09-26 VITALS
RESPIRATION RATE: 18 BRPM | SYSTOLIC BLOOD PRESSURE: 167 MMHG | OXYGEN SATURATION: 99 % | BODY MASS INDEX: 26.29 KG/M2 | DIASTOLIC BLOOD PRESSURE: 77 MMHG | TEMPERATURE: 99 F | HEART RATE: 83 BPM | HEIGHT: 64 IN | WEIGHT: 154 LBS

## 2020-09-26 LAB — TROPONIN I SERPL DL<=0.01 NG/ML-MCNC: 0.01 NG/ML (ref 0–0.03)

## 2020-09-26 PROCEDURE — 25500020 PHARM REV CODE 255: Performed by: EMERGENCY MEDICINE

## 2020-09-26 NOTE — ED TRIAGE NOTES
At 9am midsternal CP started and progressively worsened throughout the day. Pt reports CP radiates to her lower back. Pt reports seen by doctor this week and informed she has a gallstone that hasn't passed .+nausea. Denies SOB. +epigastric pains. Pt didn't take bp meds today.

## 2020-09-26 NOTE — DISCHARGE INSTRUCTIONS
You were seen in the emergency department for chest pain.  Your EKG, labs, exam, are all reassuring.  We are not sure what the cause of your chest pain is however we do not believe it is anything immediately dangerous.  Please follow-up with your primary care provider early this week.  Please return for any new or worsening chest pain, nausea, vomiting, difficulty breathing, coughing up blood, profuse sweating, dizziness, lightheadedness, numbness, weakness, or any other new or worsening concerns.    Your CT scan shows you have a small nodule.  These are not usually dangerous and do not usually need to be followed if you are a low risk patient.  High-risk patients include those who have a long smoking history.  Please discuss this with your primary care doctor.

## 2020-09-26 NOTE — ED PROVIDER NOTES
Encounter Date: 9/25/2020    SCRIBE #1 NOTE: I, Shy Castellano, am scribing for, and in the presence of,  Black Le MD. I have scribed the following portions of the note - Other sections scribed: HPI, ROS, PE.       History     Chief Complaint   Patient presents with    Chest Pain     Pt c/o epigastric pain that radiates into back with nausea and SOB, also c/o tongue numbness since this morning around 1000. Pt denies any vomiting, dizziness or cough.     CC: CP    Patient is a 60 year old female with a PMHx of Colon CA, GERD, and HTN who presents to the ED complaining of sharp, midsternal CP that began around 9 am this morning. Patient states pain has progressively worsened since onset. She rates her pain a 10/10. CP radiates to her epigastrium then to her back. She reports mild exacerbation of CP with respiration. She reports associated SOB and nausea. Per son, patient symptoms reportedly worsened since receiving a phone call that a family member passed away. She reportedly appeared to have a panic attack at home. She reported numbness to her tongue en route to ED. No Hx of similar symptoms. She reports minimal swelling to distal lower extremities at night that is not new. No Hx of blood clots. She denies fever, cough, diarrhea, constipation, melena, and blood in stool. PSHx of hysterectomy and hernia repair. She reports diagnosis of cholelithiasis this week. Patient took her HTN medication today.    The history is provided by the patient and a relative.     Review of patient's allergies indicates:  No Known Allergies  Past Medical History:   Diagnosis Date    Cancer 2013    Colon    GERD (gastroesophageal reflux disease)     Hypertension      Past Surgical History:   Procedure Laterality Date    APPENDECTOMY      COLON SURGERY  2013    Partial colectomy    HERNIA REPAIR      HYSTERECTOMY      TUBAL LIGATION       Family History   Problem Relation Age of Onset    Hypertension Mother      Hypertension Sister     Cancer Sister         Brain    Cancer Sister         Breast     Social History     Tobacco Use    Smoking status: Never Smoker    Smokeless tobacco: Never Used   Substance Use Topics    Alcohol use: No    Drug use: No     Review of Systems   Constitutional: Negative for fever.   HENT: Negative for congestion.    Respiratory: Negative for cough and shortness of breath.    Cardiovascular: Positive for chest pain and leg swelling (Distal BLE swelling; Chronic).   Gastrointestinal: Positive for abdominal pain (secondary to chest pain) and nausea. Negative for constipation, diarrhea and vomiting.   Genitourinary: Negative for dysuria.   Musculoskeletal: Positive for back pain (secondary to chest pain).   Skin: Negative for rash.   Neurological: Negative for headaches.   Psychiatric/Behavioral: Negative for confusion.       Physical Exam     Initial Vitals [09/25/20 1901]   BP Pulse Resp Temp SpO2   (!) 145/88 87 18 98.4 °F (36.9 °C) 99 %      MAP       --         Physical Exam    Nursing note and vitals reviewed.  Constitutional: She appears well-developed and well-nourished. She is not diaphoretic. No distress.   HENT:   Head: Normocephalic and atraumatic.   Mouth/Throat: Oropharynx is clear and moist.   Eyes: Conjunctivae and EOM are normal. Pupils are equal, round, and reactive to light. Right eye exhibits no discharge. Left eye exhibits no discharge.   Neck: Normal range of motion. Neck supple.   Cardiovascular: Normal rate, regular rhythm and normal heart sounds. Exam reveals no gallop and no friction rub.    No murmur heard.  Pulmonary/Chest: Breath sounds normal. No respiratory distress. She has no wheezes. She has no rhonchi. She has no rales. She exhibits no tenderness.   Abdominal: Soft. She exhibits no distension. There is abdominal tenderness. There is no rebound and no guarding.   Tenderness to palpation of her midsternal and epigastric region   Musculoskeletal: Normal range of  motion. No tenderness or edema.   Neurological: She is alert and oriented to person, place, and time. She has normal strength. No cranial nerve deficit.   Skin: Skin is warm and dry. No rash noted. No erythema.   Psychiatric: She has a normal mood and affect. Her behavior is normal. Judgment and thought content normal.         ED Course   Procedures  Labs Reviewed   CBC W/ AUTO DIFFERENTIAL - Abnormal; Notable for the following components:       Result Value    WBC 13.53 (*)     RBC 5.59 (*)     MPV 9.1 (*)     Gran # (ANC) 10.9 (*)     Immature Grans (Abs) 0.06 (*)     Gran% 80.5 (*)     Lymph% 12.9 (*)     All other components within normal limits   COMPREHENSIVE METABOLIC PANEL - Abnormal; Notable for the following components:    Potassium 3.1 (*)     Glucose 138 (*)     All other components within normal limits   D DIMER, QUANTITATIVE - Abnormal; Notable for the following components:    D-Dimer 0.77 (*)     All other components within normal limits   TROPONIN I   B-TYPE NATRIURETIC PEPTIDE   MAGNESIUM   TROPONIN I     EKG Readings: (Independently Interpreted)   Initial Reading: No STEMI. Rhythm: Normal Sinus Rhythm. Heart Rate: 83 bpm. Ectopy: No Ectopy. Conduction: Normal. ST Segments: Normal ST Segments. T Waves: Normal. Clinical Impression: Normal Sinus Rhythm       Imaging Results          CTA Chest Non-Coronary (PE Study) (Final result)  Result time 09/26/20 00:10:15    Final result by Miky Allison MD (09/26/20 00:10:15)                 Impression:      1.  No evidence of pulmonary thromboembolism or other acute intrathoracic abnormality.    2.  0.5 cm nodular opacity within the inferior lingula.  For a solid nodule <6 mm, Fleischner Society 2017 guidelines recommend no routine follow up for a low risk patient, or follow-up with non-contrast chest CT at 12 months in a high risk patient.    3.  Mild mosaic alteration of the lung parenchyma which may relate to respiratory motion, although component of  small airways or small vessel disease not excluded.    4.  Additional incidental findings as above.      Electronically signed by: Miky Allison MD  Date:    09/26/2020  Time:    00:10             Narrative:    EXAMINATION:  CTA CHEST NON CORONARY    CLINICAL HISTORY:  PE suspected, intermediate prob, positive D-dimer;    TECHNIQUE:  Low dose axial images, sagittal and coronal reformations were obtained from the thoracic inlet to the lung bases following the IV administration of 80 mL of Omnipaque 350.  Contrast timing was optimized to evaluate the pulmonary arteries.  MIP images were performed.    COMPARISON:  Chest radiograph 09/25/2020    FINDINGS:  The visualized soft tissue structures at the base of the neck are unremarkable.    The thoracic aorta maintains normal caliber, contour, and course without significant atherosclerotic calcification within its course.  There is no evidence of aneurysmal dilation or dissection. The heart is not enlarged and there is no evidence of pericardial effusion.The esophagus maintains a normal course and caliber.There is no axillary, mediastinal, or hilar lymph node enlargement.    The trachea is midline and proximal airways are patent. The lungs are symmetrically expanded. There is no pneumothorax or confluent airspace consolidation.  There is mild mosaic alteration of the lung parenchyma, most pronounced at the lung bases.  There is a 0.5 cm nodular opacity within the inferior lingula (axial series 2, image 257).  There is bibasilar dependent atelectasis.  There is no significant pleural effusion.    There is no evidence of pulmonary thromboembolism.    Limited images of the upper abdomen obtained during the course of this dedicated thoracic CT demonstrate no acute abnormalities.  There is stable mild dilatation of the extrahepatic common bile duct measuring 1.0 cm.    The osseous structures demonstrate mild degenerative changes of the spine.                                X-Ray Chest AP Portable (Final result)  Result time 09/25/20 20:35:07    Final result by Yeimy Orozco MD (09/25/20 20:35:07)                 Impression:      No acute cardiopulmonary process identified.      Electronically signed by: Yeimy Orozco MD  Date:    09/25/2020  Time:    20:35             Narrative:    EXAMINATION:  XR CHEST AP PORTABLE    CLINICAL HISTORY:  Chest Pain;    TECHNIQUE:  Single frontal view of the chest was performed.    COMPARISON:  None    FINDINGS:  Cardiac silhouette is normal in size.  Lungs are hypoinflated which accentuates pulmonary vascular markings.  Mild coarse interstitial lung changes are seen.  No evidence of focal consolidative process, pneumothorax, or significant pleural effusion.  No acute osseous abnormality identified.                                 Medical Decision Making:   Initial Assessment:   60-year-old female presenting with midsternal chest pain.  Patient reports symptoms started at 9:00 a.m. this morning.  Sharp, midsternal, mildly worsened with inspiration.  On exam well appearing no acute distress.  Vitals normal with mild hypertension.  Some tenderness to palpation of her midsternal and epigastric region.  History of colon cancer in the past, no current risk factors for PE.  Chest x-ray normal.  EKG normal.  After onset of symptoms, patient also noted receiving bad news of a family member passing away.  Reports symptoms since that have worsened.  Labs revealed no significant troponin elevation, mild hypokalemia.  We will give GI cocktail, get D-dimer.  Heart score is 3.  Will reassess after repeat labs.  ED Management:  Repeat labs unremarkable.  Dimer mildly elevated.  CTA chest unremarkable.  Discussed incidental finding of small nodule.  Believe she is safe for discharge home.  Pain improved.  Discussed need to follow-up with primary care provider.    Additional MDM:   Heart Score:    History:          Moderately suspicious.  ECG:              Normal  Age:               45-65 years  Risk factors: 1-2 risk factors  Troponin:       Less than or equal to normal limit  Final Score: 3             Scribe Attestation:   Scribe #1: I performed the above scribed service and the documentation accurately describes the services I performed. I attest to the accuracy of the note.                      Clinical Impression:       ICD-10-CM ICD-9-CM   1. Chest pain  R07.9 786.50                      Disposition:   Disposition: Discharged  Condition: Stable     ED Disposition Condition    Discharge Stable        ED Prescriptions     None        Follow-up Information     Follow up With Specialties Details Why Contact Info    Krystle Brown MD Family Medicine Schedule an appointment as soon as possible for a visit   [address] [facility] [address]  [phone]      [facility] Emergency Medicine  As needed, If symptoms worsen [address] [place] [id number] [phone]                          I, Black Le, personally performed the services described in this documentation. All medical record entries made by the scribe were at my direction and in my presence.  I have reviewed the chart and agree that the record reflects my personal performance and is accurate and complete.               Black Le MD  09/26/20 0212

## 2021-06-12 NOTE — TELEPHONE ENCOUNTER
Remote device check.  Please see link for full device report.  Patient was informed of results and next follow up via mail.     Spoke with pt. Who states her appt. To see urologist will be till septmeber 2017, she will try to seek another urologist that will see her sooner she said. If she has any other questions or needs anything from us, she wiil call us back.

## 2025-01-21 ENCOUNTER — HOSPITAL ENCOUNTER (INPATIENT)
Facility: HOSPITAL | Age: 65
LOS: 3 days | Discharge: HOME | DRG: 395 | End: 2025-01-25
Attending: STUDENT IN AN ORGANIZED HEALTH CARE EDUCATION/TRAINING PROGRAM | Admitting: INTERNAL MEDICINE
Payer: COMMERCIAL

## 2025-01-21 DIAGNOSIS — E87.6 HYPOKALEMIA: ICD-10-CM

## 2025-01-21 DIAGNOSIS — E78.5 HYPERLIPIDEMIA, UNSPECIFIED HYPERLIPIDEMIA TYPE: ICD-10-CM

## 2025-01-21 DIAGNOSIS — I10 ESSENTIAL HYPERTENSION, BENIGN: ICD-10-CM

## 2025-01-21 DIAGNOSIS — K56.609 SBO (SMALL BOWEL OBSTRUCTION): Primary | ICD-10-CM

## 2025-01-21 DIAGNOSIS — K56.609 BOWEL OBSTRUCTION: ICD-10-CM

## 2025-01-21 DIAGNOSIS — R07.9 CHEST PAIN: ICD-10-CM

## 2025-01-21 LAB
ALBUMIN SERPL BCP-MCNC: 4.4 G/DL (ref 3.5–5.2)
ALP SERPL-CCNC: 145 U/L (ref 40–150)
ALT SERPL W/O P-5'-P-CCNC: 17 U/L (ref 10–44)
ANION GAP SERPL CALC-SCNC: 14 MMOL/L (ref 8–16)
AST SERPL-CCNC: 24 U/L (ref 10–40)
BASOPHILS # BLD AUTO: 0.05 K/UL (ref 0–0.2)
BASOPHILS NFR BLD: 0.4 % (ref 0–1.9)
BILIRUB SERPL-MCNC: 0.5 MG/DL (ref 0.1–1)
BUN SERPL-MCNC: 14 MG/DL (ref 8–23)
CALCIUM SERPL-MCNC: 10.1 MG/DL (ref 8.7–10.5)
CHLORIDE SERPL-SCNC: 101 MMOL/L (ref 95–110)
CO2 SERPL-SCNC: 25 MMOL/L (ref 23–29)
CREAT SERPL-MCNC: 0.7 MG/DL (ref 0.5–1.4)
DIFFERENTIAL METHOD BLD: ABNORMAL
EOSINOPHIL # BLD AUTO: 0 K/UL (ref 0–0.5)
EOSINOPHIL NFR BLD: 0.4 % (ref 0–8)
ERYTHROCYTE [DISTWIDTH] IN BLOOD BY AUTOMATED COUNT: 13.6 % (ref 11.5–14.5)
EST. GFR  (NO RACE VARIABLE): >60 ML/MIN/1.73 M^2
GLUCOSE SERPL-MCNC: 115 MG/DL (ref 70–110)
HCT VFR BLD AUTO: 45.8 % (ref 37–48.5)
HGB BLD-MCNC: 15.2 G/DL (ref 12–16)
IMM GRANULOCYTES # BLD AUTO: 0.05 K/UL (ref 0–0.04)
IMM GRANULOCYTES NFR BLD AUTO: 0.4 % (ref 0–0.5)
LACTATE SERPL-SCNC: 1.5 MMOL/L (ref 0.5–2.2)
LIPASE SERPL-CCNC: 31 U/L (ref 4–60)
LYMPHOCYTES # BLD AUTO: 1.3 K/UL (ref 1–4.8)
LYMPHOCYTES NFR BLD: 11.3 % (ref 18–48)
MCH RBC QN AUTO: 27.9 PG (ref 27–31)
MCHC RBC AUTO-ENTMCNC: 33.2 G/DL (ref 32–36)
MCV RBC AUTO: 84 FL (ref 82–98)
MONOCYTES # BLD AUTO: 0.7 K/UL (ref 0.3–1)
MONOCYTES NFR BLD: 6.1 % (ref 4–15)
NEUTROPHILS # BLD AUTO: 9.2 K/UL (ref 1.8–7.7)
NEUTROPHILS NFR BLD: 81.4 % (ref 38–73)
NRBC BLD-RTO: 0 /100 WBC
PLATELET # BLD AUTO: 325 K/UL (ref 150–450)
PMV BLD AUTO: 8.8 FL (ref 9.2–12.9)
POTASSIUM SERPL-SCNC: 3.5 MMOL/L (ref 3.5–5.1)
PROT SERPL-MCNC: 8.6 G/DL (ref 6–8.4)
RBC # BLD AUTO: 5.44 M/UL (ref 4–5.4)
SODIUM SERPL-SCNC: 140 MMOL/L (ref 136–145)
WBC # BLD AUTO: 11.25 K/UL (ref 3.9–12.7)

## 2025-01-21 PROCEDURE — 96375 TX/PRO/DX INJ NEW DRUG ADDON: CPT

## 2025-01-21 PROCEDURE — 83735 ASSAY OF MAGNESIUM: CPT | Performed by: STUDENT IN AN ORGANIZED HEALTH CARE EDUCATION/TRAINING PROGRAM

## 2025-01-21 PROCEDURE — 96374 THER/PROPH/DIAG INJ IV PUSH: CPT

## 2025-01-21 PROCEDURE — 83690 ASSAY OF LIPASE: CPT | Performed by: STUDENT IN AN ORGANIZED HEALTH CARE EDUCATION/TRAINING PROGRAM

## 2025-01-21 PROCEDURE — 99285 EMERGENCY DEPT VISIT HI MDM: CPT | Mod: 25

## 2025-01-21 PROCEDURE — 80053 COMPREHEN METABOLIC PANEL: CPT | Performed by: STUDENT IN AN ORGANIZED HEALTH CARE EDUCATION/TRAINING PROGRAM

## 2025-01-21 PROCEDURE — 63600175 PHARM REV CODE 636 W HCPCS: Performed by: STUDENT IN AN ORGANIZED HEALTH CARE EDUCATION/TRAINING PROGRAM

## 2025-01-21 PROCEDURE — 96361 HYDRATE IV INFUSION ADD-ON: CPT

## 2025-01-21 PROCEDURE — 85025 COMPLETE CBC W/AUTO DIFF WBC: CPT | Performed by: STUDENT IN AN ORGANIZED HEALTH CARE EDUCATION/TRAINING PROGRAM

## 2025-01-21 PROCEDURE — 25000003 PHARM REV CODE 250: Performed by: STUDENT IN AN ORGANIZED HEALTH CARE EDUCATION/TRAINING PROGRAM

## 2025-01-21 PROCEDURE — 83605 ASSAY OF LACTIC ACID: CPT | Performed by: STUDENT IN AN ORGANIZED HEALTH CARE EDUCATION/TRAINING PROGRAM

## 2025-01-21 RX ORDER — MORPHINE SULFATE 4 MG/ML
4 INJECTION, SOLUTION INTRAMUSCULAR; INTRAVENOUS
Status: COMPLETED | OUTPATIENT
Start: 2025-01-21 | End: 2025-01-21

## 2025-01-21 RX ORDER — ONDANSETRON HYDROCHLORIDE 2 MG/ML
8 INJECTION, SOLUTION INTRAVENOUS
Status: COMPLETED | OUTPATIENT
Start: 2025-01-21 | End: 2025-01-21

## 2025-01-21 RX ADMIN — ONDANSETRON 8 MG: 2 INJECTION INTRAMUSCULAR; INTRAVENOUS at 11:01

## 2025-01-21 RX ADMIN — MORPHINE SULFATE 4 MG: 4 INJECTION, SOLUTION INTRAMUSCULAR; INTRAVENOUS at 11:01

## 2025-01-21 RX ADMIN — SODIUM CHLORIDE 1000 ML: 9 INJECTION, SOLUTION INTRAVENOUS at 11:01

## 2025-01-22 PROBLEM — M54.2 NECK PAIN: Status: RESOLVED | Noted: 2017-02-16 | Resolved: 2025-01-22

## 2025-01-22 PROBLEM — K56.609 SBO (SMALL BOWEL OBSTRUCTION): Status: ACTIVE | Noted: 2025-01-22

## 2025-01-22 LAB
ANION GAP SERPL CALC-SCNC: 14 MMOL/L (ref 8–16)
BASOPHILS # BLD AUTO: 0.03 K/UL (ref 0–0.2)
BASOPHILS NFR BLD: 0.2 % (ref 0–1.9)
BILIRUB UR QL STRIP: NEGATIVE
BUN SERPL-MCNC: 12 MG/DL (ref 8–23)
CALCIUM SERPL-MCNC: 9.2 MG/DL (ref 8.7–10.5)
CHLORIDE SERPL-SCNC: 107 MMOL/L (ref 95–110)
CLARITY UR: CLEAR
CO2 SERPL-SCNC: 21 MMOL/L (ref 23–29)
COLOR UR: COLORLESS
CREAT SERPL-MCNC: 0.6 MG/DL (ref 0.5–1.4)
DIFFERENTIAL METHOD BLD: ABNORMAL
EOSINOPHIL # BLD AUTO: 0 K/UL (ref 0–0.5)
EOSINOPHIL NFR BLD: 0 % (ref 0–8)
ERYTHROCYTE [DISTWIDTH] IN BLOOD BY AUTOMATED COUNT: 13.6 % (ref 11.5–14.5)
EST. GFR  (NO RACE VARIABLE): >60 ML/MIN/1.73 M^2
GLUCOSE SERPL-MCNC: 129 MG/DL (ref 70–110)
GLUCOSE UR QL STRIP: NEGATIVE
HCT VFR BLD AUTO: 43.6 % (ref 37–48.5)
HGB BLD-MCNC: 14.2 G/DL (ref 12–16)
HGB UR QL STRIP: NEGATIVE
IMM GRANULOCYTES # BLD AUTO: 0.02 K/UL (ref 0–0.04)
IMM GRANULOCYTES NFR BLD AUTO: 0.2 % (ref 0–0.5)
INR PPP: 0.9 (ref 0.8–1.2)
KETONES UR QL STRIP: NEGATIVE
LEUKOCYTE ESTERASE UR QL STRIP: NEGATIVE
LYMPHOCYTES # BLD AUTO: 0.7 K/UL (ref 1–4.8)
LYMPHOCYTES NFR BLD: 5.6 % (ref 18–48)
MAGNESIUM SERPL-MCNC: 2 MG/DL (ref 1.6–2.6)
MCH RBC QN AUTO: 28 PG (ref 27–31)
MCHC RBC AUTO-ENTMCNC: 32.6 G/DL (ref 32–36)
MCV RBC AUTO: 86 FL (ref 82–98)
MONOCYTES # BLD AUTO: 0.6 K/UL (ref 0.3–1)
MONOCYTES NFR BLD: 4.6 % (ref 4–15)
NEUTROPHILS # BLD AUTO: 11.7 K/UL (ref 1.8–7.7)
NEUTROPHILS NFR BLD: 89.4 % (ref 38–73)
NITRITE UR QL STRIP: NEGATIVE
NRBC BLD-RTO: 0 /100 WBC
PH UR STRIP: 7 [PH] (ref 5–8)
PHOSPHATE SERPL-MCNC: 3 MG/DL (ref 2.7–4.5)
PLATELET # BLD AUTO: 290 K/UL (ref 150–450)
PMV BLD AUTO: 8.7 FL (ref 9.2–12.9)
POTASSIUM SERPL-SCNC: 3.7 MMOL/L (ref 3.5–5.1)
PROT UR QL STRIP: NEGATIVE
PROTHROMBIN TIME: 10.5 SEC (ref 9–12.5)
RBC # BLD AUTO: 5.08 M/UL (ref 4–5.4)
SODIUM SERPL-SCNC: 142 MMOL/L (ref 136–145)
SP GR UR STRIP: 1.01 (ref 1–1.03)
URN SPEC COLLECT METH UR: ABNORMAL
UROBILINOGEN UR STRIP-ACNC: NEGATIVE EU/DL
WBC # BLD AUTO: 13.14 K/UL (ref 3.9–12.7)

## 2025-01-22 PROCEDURE — 85610 PROTHROMBIN TIME: CPT | Performed by: INTERNAL MEDICINE

## 2025-01-22 PROCEDURE — 11000001 HC ACUTE MED/SURG PRIVATE ROOM

## 2025-01-22 PROCEDURE — 80048 BASIC METABOLIC PNL TOTAL CA: CPT | Performed by: INTERNAL MEDICINE

## 2025-01-22 PROCEDURE — 94761 N-INVAS EAR/PLS OXIMETRY MLT: CPT

## 2025-01-22 PROCEDURE — 25500020 PHARM REV CODE 255: Performed by: STUDENT IN AN ORGANIZED HEALTH CARE EDUCATION/TRAINING PROGRAM

## 2025-01-22 PROCEDURE — 0D9670Z DRAINAGE OF STOMACH WITH DRAINAGE DEVICE, VIA NATURAL OR ARTIFICIAL OPENING: ICD-10-PCS | Performed by: STUDENT IN AN ORGANIZED HEALTH CARE EDUCATION/TRAINING PROGRAM

## 2025-01-22 PROCEDURE — 85025 COMPLETE CBC W/AUTO DIFF WBC: CPT | Performed by: INTERNAL MEDICINE

## 2025-01-22 PROCEDURE — 63600175 PHARM REV CODE 636 W HCPCS: Performed by: STUDENT IN AN ORGANIZED HEALTH CARE EDUCATION/TRAINING PROGRAM

## 2025-01-22 PROCEDURE — 25000003 PHARM REV CODE 250: Performed by: STUDENT IN AN ORGANIZED HEALTH CARE EDUCATION/TRAINING PROGRAM

## 2025-01-22 PROCEDURE — 36415 COLL VENOUS BLD VENIPUNCTURE: CPT | Performed by: INTERNAL MEDICINE

## 2025-01-22 PROCEDURE — 99223 1ST HOSP IP/OBS HIGH 75: CPT | Mod: ,,, | Performed by: SURGERY

## 2025-01-22 PROCEDURE — 63600175 PHARM REV CODE 636 W HCPCS: Performed by: INTERNAL MEDICINE

## 2025-01-22 PROCEDURE — 84100 ASSAY OF PHOSPHORUS: CPT | Performed by: INTERNAL MEDICINE

## 2025-01-22 PROCEDURE — 81003 URINALYSIS AUTO W/O SCOPE: CPT | Performed by: STUDENT IN AN ORGANIZED HEALTH CARE EDUCATION/TRAINING PROGRAM

## 2025-01-22 PROCEDURE — 25000003 PHARM REV CODE 250: Performed by: INTERNAL MEDICINE

## 2025-01-22 RX ORDER — PANTOPRAZOLE SODIUM 40 MG/10ML
40 INJECTION, POWDER, LYOPHILIZED, FOR SOLUTION INTRAVENOUS DAILY
Status: DISCONTINUED | OUTPATIENT
Start: 2025-01-22 | End: 2025-01-22

## 2025-01-22 RX ORDER — MORPHINE SULFATE 4 MG/ML
4 INJECTION, SOLUTION INTRAMUSCULAR; INTRAVENOUS
Status: COMPLETED | OUTPATIENT
Start: 2025-01-22 | End: 2025-01-22

## 2025-01-22 RX ORDER — MORPHINE SULFATE 4 MG/ML
2 INJECTION, SOLUTION INTRAMUSCULAR; INTRAVENOUS EVERY 6 HOURS PRN
Status: DISCONTINUED | OUTPATIENT
Start: 2025-01-22 | End: 2025-01-25 | Stop reason: HOSPADM

## 2025-01-22 RX ORDER — LIDOCAINE HYDROCHLORIDE 20 MG/ML
JELLY TOPICAL
Status: DISCONTINUED | OUTPATIENT
Start: 2025-01-22 | End: 2025-01-22

## 2025-01-22 RX ORDER — MUPIROCIN 20 MG/G
OINTMENT TOPICAL 2 TIMES DAILY
Status: DISCONTINUED | OUTPATIENT
Start: 2025-01-22 | End: 2025-01-25 | Stop reason: HOSPADM

## 2025-01-22 RX ORDER — MORPHINE SULFATE 4 MG/ML
2 INJECTION, SOLUTION INTRAMUSCULAR; INTRAVENOUS ONCE
Status: COMPLETED | OUTPATIENT
Start: 2025-01-22 | End: 2025-01-22

## 2025-01-22 RX ORDER — ACETAMINOPHEN 325 MG/1
650 TABLET ORAL EVERY 4 HOURS PRN
Status: DISCONTINUED | OUTPATIENT
Start: 2025-01-22 | End: 2025-01-25 | Stop reason: HOSPADM

## 2025-01-22 RX ORDER — POTASSIUM CHLORIDE 7.45 MG/ML
10 INJECTION INTRAVENOUS
Status: DISPENSED | OUTPATIENT
Start: 2025-01-22 | End: 2025-01-22

## 2025-01-22 RX ORDER — SODIUM CHLORIDE, SODIUM LACTATE, POTASSIUM CHLORIDE, CALCIUM CHLORIDE 600; 310; 30; 20 MG/100ML; MG/100ML; MG/100ML; MG/100ML
INJECTION, SOLUTION INTRAVENOUS CONTINUOUS
Status: ACTIVE | OUTPATIENT
Start: 2025-01-22 | End: 2025-01-22

## 2025-01-22 RX ORDER — ONDANSETRON HYDROCHLORIDE 2 MG/ML
4 INJECTION, SOLUTION INTRAVENOUS EVERY 6 HOURS PRN
Status: DISCONTINUED | OUTPATIENT
Start: 2025-01-22 | End: 2025-01-25 | Stop reason: HOSPADM

## 2025-01-22 RX ORDER — SODIUM CHLORIDE 0.9 % (FLUSH) 0.9 %
10 SYRINGE (ML) INJECTION EVERY 12 HOURS PRN
Status: DISCONTINUED | OUTPATIENT
Start: 2025-01-22 | End: 2025-01-25 | Stop reason: HOSPADM

## 2025-01-22 RX ORDER — MORPHINE SULFATE 4 MG/ML
1 INJECTION, SOLUTION INTRAMUSCULAR; INTRAVENOUS EVERY 4 HOURS PRN
Status: ACTIVE | OUTPATIENT
Start: 2025-01-22 | End: 2025-01-25

## 2025-01-22 RX ORDER — HYDRALAZINE HYDROCHLORIDE 20 MG/ML
5 INJECTION INTRAMUSCULAR; INTRAVENOUS EVERY 6 HOURS PRN
Status: DISCONTINUED | OUTPATIENT
Start: 2025-01-22 | End: 2025-01-25 | Stop reason: HOSPADM

## 2025-01-22 RX ORDER — PANTOPRAZOLE SODIUM 40 MG/10ML
40 INJECTION, POWDER, LYOPHILIZED, FOR SOLUTION INTRAVENOUS 2 TIMES DAILY
Status: DISCONTINUED | OUTPATIENT
Start: 2025-01-22 | End: 2025-01-25 | Stop reason: HOSPADM

## 2025-01-22 RX ORDER — METOCLOPRAMIDE HYDROCHLORIDE 5 MG/ML
10 INJECTION INTRAMUSCULAR; INTRAVENOUS
Status: COMPLETED | OUTPATIENT
Start: 2025-01-22 | End: 2025-01-22

## 2025-01-22 RX ORDER — ONDANSETRON HYDROCHLORIDE 4 MG/5ML
4 SOLUTION ORAL EVERY 6 HOURS
Status: DISCONTINUED | OUTPATIENT
Start: 2025-01-22 | End: 2025-01-22

## 2025-01-22 RX ORDER — ONDANSETRON HYDROCHLORIDE 2 MG/ML
4 INJECTION, SOLUTION INTRAVENOUS EVERY 6 HOURS
Status: DISPENSED | OUTPATIENT
Start: 2025-01-22 | End: 2025-01-23

## 2025-01-22 RX ORDER — NALOXONE HCL 0.4 MG/ML
0.4 VIAL (ML) INJECTION
Status: DISCONTINUED | OUTPATIENT
Start: 2025-01-22 | End: 2025-01-25 | Stop reason: HOSPADM

## 2025-01-22 RX ADMIN — MORPHINE SULFATE 2 MG: 4 INJECTION, SOLUTION INTRAMUSCULAR; INTRAVENOUS at 04:01

## 2025-01-22 RX ADMIN — MORPHINE SULFATE 4 MG: 4 INJECTION, SOLUTION INTRAMUSCULAR; INTRAVENOUS at 01:01

## 2025-01-22 RX ADMIN — MORPHINE SULFATE 2 MG: 4 INJECTION, SOLUTION INTRAMUSCULAR; INTRAVENOUS at 06:01

## 2025-01-22 RX ADMIN — MUPIROCIN: 20 OINTMENT TOPICAL at 08:01

## 2025-01-22 RX ADMIN — PANTOPRAZOLE SODIUM 40 MG: 40 INJECTION, POWDER, LYOPHILIZED, FOR SOLUTION INTRAVENOUS at 08:01

## 2025-01-22 RX ADMIN — ONDANSETRON 4 MG: 2 INJECTION INTRAMUSCULAR; INTRAVENOUS at 08:01

## 2025-01-22 RX ADMIN — METOCLOPRAMIDE 10 MG: 5 INJECTION, SOLUTION INTRAMUSCULAR; INTRAVENOUS at 01:01

## 2025-01-22 RX ADMIN — PROMETHAZINE HYDROCHLORIDE 12.5 MG: 25 INJECTION INTRAMUSCULAR; INTRAVENOUS at 03:01

## 2025-01-22 RX ADMIN — IOHEXOL 75 ML: 350 INJECTION, SOLUTION INTRAVENOUS at 12:01

## 2025-01-22 RX ADMIN — ONDANSETRON 4 MG: 2 INJECTION INTRAMUSCULAR; INTRAVENOUS at 05:01

## 2025-01-22 RX ADMIN — SODIUM CHLORIDE, POTASSIUM CHLORIDE, SODIUM LACTATE AND CALCIUM CHLORIDE: 600; 310; 30; 20 INJECTION, SOLUTION INTRAVENOUS at 02:01

## 2025-01-22 RX ADMIN — PANTOPRAZOLE SODIUM 40 MG: 40 INJECTION, POWDER, LYOPHILIZED, FOR SOLUTION INTRAVENOUS at 01:01

## 2025-01-22 RX ADMIN — ONDANSETRON 4 MG: 2 INJECTION INTRAMUSCULAR; INTRAVENOUS at 03:01

## 2025-01-22 RX ADMIN — POTASSIUM CHLORIDE 10 MEQ: 7.46 INJECTION, SOLUTION INTRAVENOUS at 01:01

## 2025-01-22 NOTE — ASSESSMENT & PLAN NOTE
Patient's blood pressure range in the last 24 hours was: BP  Min: 148/71  Max: 162/79.The patient's inpatient anti-hypertensive regimen is listed below:  Current Antihypertensives  hydrALAZINE injection 5 mg, Every 6 hours PRN, Intravenous    Plan  - HOLD HCTZ for now given N/V and anticipated NPO status.   -Control Pain  -PRN hydralazine till able to resume home BP meds.

## 2025-01-22 NOTE — H&P
[facility] Emergency Dept  [facility] Medicine  History & Physical    Patient Name: Lisa Do  MRN: [id number]  Patient Class: IP- Inpatient  Admission Date: 1/21/2025  Attending Physician:  Dr. Chiqui Dia   Primary Care Provider: Jose Echevarria MD    Patient information was obtained from patient, past medical records, and ER records.     Subjective:     Principal Problem:Abdominal pain and nausea     Chief Complaint:   Chief Complaint   Patient presents with    Emesis     Pt with ostomy, pt with vomiting and thinks that she has bowel obstruction. Pain is 10/10.         HPI: 64 y.o. woman with h/o GERD, Recurrent Adenocarcinoma of the large intestine(s/p radiation prior to sigmoid colectomy with colostomy by Dr. Zavala at [facility] in 11/2022 followed by chemo which ended 1/2024), h/o SBO in the past due to adhesions (s/p JONY and repiar of parastomal hernia 3/2024) , Essential HTN  presents to the Er with c/o N/V x1 hours with associated abomdinal pain. No fevers or chills. NO chest pain or SOB/Najera. Was recently admitted to [facility] on 12/22/2024 with SBO which resolved with bowl rest and IVF.     Work up in the ER noted for WBC 11 and H/H 15/45. Lytes with normal renal and liver function. Lipase wnl.   UA not c/w acute UTI.     CT abd/pelvis with IV contrast:   1. Postsurgical changes of distal colon resection with left-sided colostomy.  Large parastomal hernia is seen containing multiple loops of small bowel which appears to be resulting in at least partial small bowel obstruction.   2. Additional mild wall thickening seen involving left lower quadrant small bowel loops could reflect short segment acute enteritis.     IV pain meds and IVF started. NGT placed by ER. We have been consulted for further management.     Past Medical History:   Diagnosis Date    Cancer 2013    Colon    GERD (gastroesophageal reflux disease)     Hypertension        Past Surgical History:   Procedure Laterality Date     APPENDECTOMY      COLON SURGERY  2013    Partial colectomy    HERNIA REPAIR      HYSTERECTOMY      TUBAL LIGATION         Review of patient's allergies indicates:  No Known Allergies    No current facility-administered medications on file prior to encounter.     Current Outpatient Medications on File Prior to Encounter   Medication Sig    pantoprazole (PROTONIX) 40 MG tablet Take 1 tablet (40 mg total) by mouth once daily.    cetirizine (ZYRTEC) 10 MG tablet Take 10 mg by mouth once daily.    doxycycline (MONODOX) 100 MG capsule Take 100 mg by mouth 2 (two) times daily.    hydroCHLOROthiazide (HYDRODIURIL) 25 MG tablet Take 25 mg by mouth once daily.     Family History       Problem Relation (Age of Onset)    Cancer Sister, Sister    Hypertension Mother, Sister          Tobacco Use    Smoking status: Never    Smokeless tobacco: Never   Substance and Sexual Activity    Alcohol use: No    Drug use: No    Sexual activity: Yes     Partners: Male     Review of Systems   Constitutional:  Positive for appetite change. Negative for activity change, chills, diaphoresis, fatigue and fever.   HENT:  Negative for congestion and sore throat.    Eyes:  Negative for visual disturbance.   Respiratory:  Negative for cough, chest tightness, shortness of breath and wheezing.    Cardiovascular:  Negative for chest pain, palpitations and leg swelling.   Gastrointestinal:  Positive for abdominal distention, abdominal pain, nausea and vomiting. Negative for constipation and diarrhea.   Genitourinary:  Negative for dysuria.   Musculoskeletal:  Negative for arthralgias and myalgias.   Neurological:  Negative for dizziness, light-headedness and headaches.   Psychiatric/Behavioral:  The patient is not nervous/anxious.      Objective:     Vital Signs (Most Recent):  Temp: 98.5 °F (36.9 °C) (01/22/25 0300)  Pulse: 69 (01/22/25 0300)  Resp: 14 (01/22/25 0300)  BP: (!) 144/69 (01/22/25 0300)  SpO2: 98 % (01/22/25 0300) Vital Signs (24h  Range):  Temp:  [98 °F (36.7 °C)-98.5 °F (36.9 °C)] 98.5 °F (36.9 °C)  Pulse:  [69-85] 69  Resp:  [14-19] 14  SpO2:  [98 %-100 %] 98 %  BP: (141-162)/(69-80) 144/69     Weight: 61.2 kg (135 lb)  Body mass index is 25.51 kg/m².     Physical Exam  Vitals and nursing note reviewed.   Constitutional:       General: She is not in acute distress.     Appearance: Normal appearance. She is ill-appearing. She is not toxic-appearing or diaphoretic.   HENT:      Head: Normocephalic and atraumatic.      Nose:      Comments: NGT in place with yellow gastric content and partially digested food.      Mouth/Throat:      Mouth: Mucous membranes are dry.      Pharynx: Oropharynx is clear. No oropharyngeal exudate or posterior oropharyngeal erythema.   Eyes:      General: No scleral icterus.     Extraocular Movements: Extraocular movements intact.      Conjunctiva/sclera: Conjunctivae normal.      Pupils: Pupils are equal, round, and reactive to light.   Cardiovascular:      Rate and Rhythm: Normal rate and regular rhythm.      Pulses: Normal pulses.      Heart sounds: No murmur heard.     No friction rub. No gallop.   Pulmonary:      Effort: Pulmonary effort is normal. No respiratory distress.      Breath sounds: No wheezing, rhonchi or rales.   Abdominal:      General: There is distension.      Palpations: Abdomen is soft.      Tenderness: There is abdominal tenderness. There is no guarding or rebound.   Musculoskeletal:         General: No swelling. Normal range of motion.      Cervical back: Normal range of motion and neck supple.      Right lower leg: No edema.      Left lower leg: No edema.   Skin:     General: Skin is warm.      Capillary Refill: Capillary refill takes less than 2 seconds.      Coloration: Skin is pale.   Neurological:      Mental Status: She is alert and oriented to person, place, and time.      Cranial Nerves: No cranial nerve deficit.      Motor: No weakness.      Coordination: Coordination normal.    Psychiatric:         Mood and Affect: Mood normal.         Behavior: Behavior normal.          Recent Results (from the past 24 hours)   CBC W/ AUTO DIFFERENTIAL    Collection Time: 01/21/25 11:17 PM   Result Value Ref Range    WBC 11.25 3.90 - 12.70 K/uL    RBC 5.44 (H) 4.00 - 5.40 M/uL    Hemoglobin 15.2 12.0 - 16.0 g/dL    Hematocrit 45.8 37.0 - 48.5 %    MCV 84 82 - 98 fL    MCH 27.9 27.0 - 31.0 pg    MCHC 33.2 32.0 - 36.0 g/dL    RDW 13.6 11.5 - 14.5 %    Platelets 325 150 - 450 K/uL    MPV 8.8 (L) 9.2 - 12.9 fL    Immature Granulocytes 0.4 0.0 - 0.5 %    Gran # (ANC) 9.2 (H) 1.8 - 7.7 K/uL    Immature Grans (Abs) 0.05 (H) 0.00 - 0.04 K/uL    Lymph # 1.3 1.0 - 4.8 K/uL    Mono # 0.7 0.3 - 1.0 K/uL    Eos # 0.0 0.0 - 0.5 K/uL    Baso # 0.05 0.00 - 0.20 K/uL    nRBC 0 0 /100 WBC    Gran % 81.4 (H) 38.0 - 73.0 %    Lymph % 11.3 (L) 18.0 - 48.0 %    Mono % 6.1 4.0 - 15.0 %    Eosinophil % 0.4 0.0 - 8.0 %    Basophil % 0.4 0.0 - 1.9 %    Differential Method Automated    Comp. Metabolic Panel    Collection Time: 01/21/25 11:17 PM   Result Value Ref Range    Sodium 140 136 - 145 mmol/L    Potassium 3.5 3.5 - 5.1 mmol/L    Chloride 101 95 - 110 mmol/L    CO2 25 23 - 29 mmol/L    Glucose 115 (H) 70 - 110 mg/dL    BUN 14 8 - 23 mg/dL    Creatinine 0.7 0.5 - 1.4 mg/dL    Calcium 10.1 8.7 - 10.5 mg/dL    Total Protein 8.6 (H) 6.0 - 8.4 g/dL    Albumin 4.4 3.5 - 5.2 g/dL    Total Bilirubin 0.5 0.1 - 1.0 mg/dL    Alkaline Phosphatase 145 40 - 150 U/L    AST 24 10 - 40 U/L    ALT 17 10 - 44 U/L    eGFR >60 >60 mL/min/1.73 m^2    Anion Gap 14 8 - 16 mmol/L   Lipase    Collection Time: 01/21/25 11:17 PM   Result Value Ref Range    Lipase 31 4 - 60 U/L   Lactic acid, plasma    Collection Time: 01/21/25 11:17 PM   Result Value Ref Range    Lactate (Lactic Acid) 1.5 0.5 - 2.2 mmol/L   Magnesium    Collection Time: 01/21/25 11:17 PM   Result Value Ref Range    Magnesium 2.0 1.6 - 2.6 mg/dL   Urinalysis, Reflex to Urine Culture  Urine, Clean Catch    Collection Time: 01/22/25 12:11 AM    Specimen: Urine   Result Value Ref Range    Specimen UA Urine, Clean Catch     Color, UA Colorless (A) Yellow, Straw, Candi    Appearance, UA Clear Clear    pH, UA 7.0 5.0 - 8.0    Specific Gravity, UA 1.010 1.005 - 1.030    Protein, UA Negative Negative    Glucose, UA Negative Negative    Ketones, UA Negative Negative    Bilirubin (UA) Negative Negative    Occult Blood UA Negative Negative    Nitrite, UA Negative Negative    Urobilinogen, UA Negative <2.0 EU/dL    Leukocytes, UA Negative Negative       Microbiology Results (last 7 days)       ** No results found for the last 168 hours. **            Imaging Results              XR NG/OG tube placement check, non-radiologist performed (Final result)  Result time 01/22/25 03:01:06   Procedure changed from XR Gastric tube check, non-radiologist performed     Final result by Shane Rae MD (01/22/25 03:01:06)                   Impression:      Enteric tube tip in the stomach.      Electronically signed by: Shane Rae MD  Date:    01/22/2025  Time:    03:01               Narrative:    EXAMINATION:  XR NG/OG TUBE PLACEMENT CHECK, NON-RADIOLOGIST PERFORMED    CLINICAL HISTORY:  NGT placement;    TECHNIQUE:  Single AP View of the abdomen was performed centered over the diaphragm for tube placement.    COMPARISON:  01/21/2025.    FINDINGS:  Enteric tube tip in the stomach.    The visualized bowel gas pattern is nonobstructed.    Some residual contrast in the renal collecting systems from recent CT exam.                                       CT Abdomen Pelvis With IV Contrast NO Oral Contrast (Final result)  Result time 01/22/25 00:56:01      Final result by Yeimy Orozco MD (01/22/25 00:56:01)                   Impression:      1. Postsurgical changes of distal colon resection with left-sided colostomy.  Large parastomal hernia is seen containing multiple loops of small bowel which appears to be  resulting in at least partial small bowel obstruction.  2. Additional mild wall thickening seen involving left lower quadrant small bowel loops could reflect short segment acute enteritis.  3. Additional findings as detailed above.      Electronically signed by: Yeimy Orozco MD  Date:    01/22/2025  Time:    00:56               Narrative:    EXAMINATION:  CT ABDOMEN PELVIS WITH IV CONTRAST    CLINICAL HISTORY:  Abdominal abscess/infection suspected;    TECHNIQUE:  Low dose axial images, sagittal and coronal reformations were obtained from the lung bases to the pubic symphysis following the IV administration of 75 mL of Omnipaque 350 .  Oral contrast was not given.    COMPARISON:  None.    FINDINGS:  The visualized portion of the heart is unremarkable.  The lung bases are clear.    No significant hepatic abnormalities are identified.  There is no intra-or extrahepatic biliary ductal dilatation.  The gallbladder is unremarkable.  The pancreas, spleen, and adrenal glands are unremarkable.    Kidneys enhance normally with no evidence of hydronephrosis.  No abnormalities are seen along the ureteral courses.  Urinary bladder is unremarkable.  Uterus has been removed.    Postsurgical changes of distal colon resection are seen with left-sided colostomy.  There is a large parastomal hernia containing portion of the left colon and multiple loops of small bowel.  This appears to be resulting in obstruction.  Small bowel loops are dilated proximal from this level measuring upwards of 4 cm in caliber with fluid distention and small bowel feces sign seen.  There is short segment wall thickening involving the small bowel loops in the left lower quadrant outside of the hernia which could reflect short segment region of acute enteritis.  Stomach is distended with fluid and ingested material.  Appendix is not definitely visualized.  No free air or free fluid.    Aorta tapers normally.    No acute osseous abnormality identified.  No  few stable vertebral body mixed sclerotic/lucent lesions are seen possibly representing hemangiomas or atypical hemangiomas.  This is most pronounced involving the L2 vertebral body.  Findings are stable since at least 2017 CT.                                       X-Ray Abdomen Flat And Erect (Final result)  Result time 01/22/25 00:06:13      Final result by Yiemy Orozco MD (01/22/25 00:06:13)                   Impression:      Nonobstructive bowel gas pattern.      Electronically signed by: Yeimy Orozco MD  Date:    01/22/2025  Time:    00:06               Narrative:    EXAMINATION:  XR ABDOMEN FLAT AND ERECT    CLINICAL HISTORY:  Unspecified intestinal obstruction, unspecified as to partial versus complete obstruction    TECHNIQUE:  Flat and erect AP views of the abdomen were performed.    COMPARISON:  None    FINDINGS:  Nonspecific bowel gas pattern.  No evidence to suggest obstruction.  No free air identified.  Moderate volume retained stool is seen in the colon.  Clips are seen over the lower abdomen and pelvic region.  Partially visualized lung bases are clear.                                        Assessment/Plan:     SBO (small bowel obstruction)  Due to recurrent parastomal hernia (recently had one repaired by Dr. Zavala at [facility] 3/2024). NPO and general surgical consult placed. NGT to low/intermittent. Pain meds started and IVF. F/u on lytes and replace as needed. Monitor on tele with pulse ox for now.       Gastroesophageal reflux disease  PPI daily       Essential hypertension, benign  Patient's blood pressure range in the last 24 hours was: BP  Min: 148/71  Max: 162/79.The patient's inpatient anti-hypertensive regimen is listed below:  Current Antihypertensives  hydrALAZINE injection 5 mg, Every 6 hours PRN, Intravenous    Plan  - HOLD HCTZ for now given N/V and anticipated NPO status.   -Control Pain  -PRN hydralazine till able to resume home BP meds.     History of colon cancer  S/p  cassandra's with resection of recurrent adenocarcinoma of the large intestine in 11/2023 ([facility]). S/p post-op chemo which ended 1/2024. Did have radiation prior to surgery in 2023. F/u with GI and oncology as scheduled outpatient.         VTE Risk Mitigation (From admission, onward)           Ordered     IP VTE LOW RISK PATIENT  Once         01/22/25 0111     Place sequential compression device  Until discontinued         01/22/25 0111                   CODE STATUS: FULL CODE        Chiqui Dia MD  Department of Hospital Medicine  [facility] - Emergency Dept

## 2025-01-22 NOTE — CARE UPDATE
65 y/o F with history of Recurrent Adenocarcinoma of the large intestine and SBO in the past due to adhesions admitted on 01/12/2025 for partial small-bowel obstruction.  Presented with abdominal pain associated with nausea and vomiting.CT abd/pelvis with contrast showed large parastomal hernia  containing multiple loops of small bowel which appears to be resulting in at least partial small bowel obstruction. NGT placed in the ED. General surgery consulted    Continue IV fluids and NGT at this time.  General surgery. recommend conservative management with NG suction, ivf hydration and serial abd exams.    I reviewed the patient's medications, past medical/surgical history and the H&P note. I agree with the physical exam and assessment and plan.

## 2025-01-22 NOTE — HPI
64 y.o. woman with h/o GERD, Recurrent Adenocarcinoma of the large intestine(s/p radiation prior to sigmoid colectomy with colostomy by Dr. Zavala at [facility] in 11/2022 followed by chemo which ended 1/2024), h/o SBO in the past due to adhesions (s/p JONY and repiar of parastomal hernia 3/2024) , Essential HTN  presents to the Er with c/o N/V x1 hours with associated abomdinal pain. No fevers or chills. NO chest pain or SOB/Najera. Was recently admitted to [facility] on 12/22/2024 with SBO which resolved with bowl rest and IVF.     Work up in the ER noted for WBC 11 and H/H 15/45. Lytes with normal renal and liver function. Lipase wnl.   UA not c/w acute UTI.     CT abd/pelvis with IV contrast:   1. Postsurgical changes of distal colon resection with left-sided colostomy.  Large parastomal hernia is seen containing multiple loops of small bowel which appears to be resulting in at least partial small bowel obstruction.   2. Additional mild wall thickening seen involving left lower quadrant small bowel loops could reflect short segment acute enteritis.     IV pain meds and IVF started. NGT placed by ER. We have been consulted for further management.

## 2025-01-22 NOTE — PLAN OF CARE
Recommendation:  1. As GI function improves, consider initiating clear liquid diet with boost breeze BID advancing to GI soft diet as tolerated  2. If pt to remain with NGT for LIWS, monitor need for PPN of [employer] 4.25/5 at 100mL/hr + daily IVFE to provide 1316 kcal, 102 g protein, 2400mL fluid. GIR=1.4 mg/kg/min.  3. Monitor weight/labs/NGT output  4. RD to follow to monitor nutrition status and PO intake    Goals: Pt to meet 25-50% EEN/EPN by RD follow-up  Nutrition Goal Status: new

## 2025-01-22 NOTE — ED PROVIDER NOTES
Encounter Date: 1/21/2025       History     Chief Complaint   Patient presents with    Emesis     Pt with ostomy, pt with vomiting and thinks that she has bowel obstruction. Pain is 10/10.      64-year-old female with history of colon cancer s/p colectomy with ostomy and recurrent SBO's presents now for nausea, vomiting and diffuse abdominal pain concerning her for recurrent SBO.       Review of patient's allergies indicates:  No Known Allergies  Past Medical History:   Diagnosis Date    Cancer 2013    Colon    GERD (gastroesophageal reflux disease)     Hypertension      Past Surgical History:   Procedure Laterality Date    APPENDECTOMY      COLON SURGERY  2013    Partial colectomy    HERNIA REPAIR      HYSTERECTOMY      TUBAL LIGATION       Family History   Problem Relation Name Age of Onset    Hypertension Mother      Hypertension Sister      Cancer Sister          Brain    Cancer Sister          Breast     Social History     Tobacco Use    Smoking status: Never    Smokeless tobacco: Never   Substance Use Topics    Alcohol use: No    Drug use: No     Review of Systems   Gastrointestinal:  Positive for abdominal pain, nausea and vomiting.       Physical Exam     Initial Vitals [01/21/25 2259]   BP Pulse Resp Temp SpO2   (!) 162/80 80 18 98.4 °F (36.9 °C) 98 %      MAP       --         Physical Exam    Nursing note and vitals reviewed.  Constitutional: She appears well-developed and well-nourished. She is not diaphoretic.   HENT:   Head: Normocephalic and atraumatic. Mouth/Throat: Oropharynx is clear and moist.   Eyes: EOM are normal. Pupils are equal, round, and reactive to light. Right eye exhibits no discharge. Left eye exhibits no discharge.   Neck: No tracheal deviation present.   Normal range of motion.  Cardiovascular:  Normal rate, regular rhythm and intact distal pulses.           Pulmonary/Chest: No respiratory distress. She has no wheezes. She exhibits no tenderness.   Abdominal: Abdomen is soft. She  exhibits distension. There is abdominal tenderness.   Midline surgical scar, LLQ ostomy in place with stool. Diffuse TTP w/o rebound or guarding   Musculoskeletal:         General: No tenderness or edema. Normal range of motion.      Cervical back: Normal range of motion.     Neurological: She is alert and oriented to person, place, and time. She has normal strength. No cranial nerve deficit or sensory deficit. GCS eye subscore is 4. GCS verbal subscore is 5. GCS motor subscore is 6.   Skin: Skin is warm and dry. No rash noted.   Psychiatric: She has a normal mood and affect. Her behavior is normal. Thought content normal.         ED Course   Procedures  Labs Reviewed   CBC W/ AUTO DIFFERENTIAL - Abnormal       Result Value    WBC 11.25      RBC 5.44 (*)     Hemoglobin 15.2      Hematocrit 45.8      MCV 84      MCH 27.9      MCHC 33.2      RDW 13.6      Platelets 325      MPV 8.8 (*)     Immature Granulocytes 0.4      Gran # (ANC) 9.2 (*)     Immature Grans (Abs) 0.05 (*)     Lymph # 1.3      Mono # 0.7      Eos # 0.0      Baso # 0.05      nRBC 0      Gran % 81.4 (*)     Lymph % 11.3 (*)     Mono % 6.1      Eosinophil % 0.4      Basophil % 0.4      Differential Method Automated     COMPREHENSIVE METABOLIC PANEL - Abnormal    Sodium 140      Potassium 3.5      Chloride 101      CO2 25      Glucose 115 (*)     BUN 14      Creatinine 0.7      Calcium 10.1      Total Protein 8.6 (*)     Albumin 4.4      Total Bilirubin 0.5      Alkaline Phosphatase 145      AST 24      ALT 17      eGFR >60      Anion Gap 14     URINALYSIS, REFLEX TO URINE CULTURE - Abnormal    Specimen UA Urine, Clean Catch      Color, UA Colorless (*)     Appearance, UA Clear      pH, UA 7.0      Specific Gravity, UA 1.010      Protein, UA Negative      Glucose, UA Negative      Ketones, UA Negative      Bilirubin (UA) Negative      Occult Blood UA Negative      Nitrite, UA Negative      Urobilinogen, UA Negative      Leukocytes, UA Negative       Narrative:     Specimen Source->Urine   LIPASE    Lipase 31     LACTIC ACID, PLASMA    Lactate (Lactic Acid) 1.5     MAGNESIUM   MAGNESIUM    Magnesium 2.0     BASIC METABOLIC PANEL   PROTIME-INR   CBC W/ AUTO DIFFERENTIAL          Imaging Results              CT Abdomen Pelvis With IV Contrast NO Oral Contrast (Final result)  Result time 01/22/25 00:56:01      Final result by Yeimy Orozco MD (01/22/25 00:56:01)                   Impression:      1. Postsurgical changes of distal colon resection with left-sided colostomy.  Large parastomal hernia is seen containing multiple loops of small bowel which appears to be resulting in at least partial small bowel obstruction.  2. Additional mild wall thickening seen involving left lower quadrant small bowel loops could reflect short segment acute enteritis.  3. Additional findings as detailed above.      Electronically signed by: Yeimy Orozco MD  Date:    01/22/2025  Time:    00:56               Narrative:    EXAMINATION:  CT ABDOMEN PELVIS WITH IV CONTRAST    CLINICAL HISTORY:  Abdominal abscess/infection suspected;    TECHNIQUE:  Low dose axial images, sagittal and coronal reformations were obtained from the lung bases to the pubic symphysis following the IV administration of 75 mL of Omnipaque 350 .  Oral contrast was not given.    COMPARISON:  None.    FINDINGS:  The visualized portion of the heart is unremarkable.  The lung bases are clear.    No significant hepatic abnormalities are identified.  There is no intra-or extrahepatic biliary ductal dilatation.  The gallbladder is unremarkable.  The pancreas, spleen, and adrenal glands are unremarkable.    Kidneys enhance normally with no evidence of hydronephrosis.  No abnormalities are seen along the ureteral courses.  Urinary bladder is unremarkable.  Uterus has been removed.    Postsurgical changes of distal colon resection are seen with left-sided colostomy.  There is a large parastomal hernia containing  portion of the left colon and multiple loops of small bowel.  This appears to be resulting in obstruction.  Small bowel loops are dilated proximal from this level measuring upwards of 4 cm in caliber with fluid distention and small bowel feces sign seen.  There is short segment wall thickening involving the small bowel loops in the left lower quadrant outside of the hernia which could reflect short segment region of acute enteritis.  Stomach is distended with fluid and ingested material.  Appendix is not definitely visualized.  No free air or free fluid.    Aorta tapers normally.    No acute osseous abnormality identified.  No few stable vertebral body mixed sclerotic/lucent lesions are seen possibly representing hemangiomas or atypical hemangiomas.  This is most pronounced involving the L2 vertebral body.  Findings are stable since at least 2017 CT.                                       X-Ray Abdomen Flat And Erect (Final result)  Result time 01/22/25 00:06:13      Final result by Yeimy Orozco MD (01/22/25 00:06:13)                   Impression:      Nonobstructive bowel gas pattern.      Electronically signed by: Yeimy Orozco MD  Date:    01/22/2025  Time:    00:06               Narrative:    EXAMINATION:  XR ABDOMEN FLAT AND ERECT    CLINICAL HISTORY:  Unspecified intestinal obstruction, unspecified as to partial versus complete obstruction    TECHNIQUE:  Flat and erect AP views of the abdomen were performed.    COMPARISON:  None    FINDINGS:  Nonspecific bowel gas pattern.  No evidence to suggest obstruction.  No free air identified.  Moderate volume retained stool is seen in the colon.  Clips are seen over the lower abdomen and pelvic region.  Partially visualized lung bases are clear.                                       Medications   hydrALAZINE injection 5 mg (has no administration in time range)   pantoprazole injection 40 mg (40 mg Intravenous Given 1/22/25 0124)   ondansetron injection 4 mg (has  no administration in time range)   promethazine (PHENERGAN) 12.5 mg in 0.9% NaCl 50 mL IVPB (has no administration in time range)   ondansetron 4 mg/5 mL solution 4 mg (has no administration in time range)   morphine injection 1 mg (has no administration in time range)   morphine injection 2 mg (has no administration in time range)   sodium chloride 0.9% flush 10 mL (has no administration in time range)   naloxone 0.4 mg/mL injection 0.4 mg (has no administration in time range)   acetaminophen tablet 650 mg (has no administration in time range)   potassium chloride 10 mEq in 100 mL IVPB (0 mEq Intravenous Stopped 1/22/25 0223)   lactated ringers infusion (has no administration in time range)   sodium chloride 0.9% bolus 1,000 mL 1,000 mL (0 mLs Intravenous Stopped 1/21/25 2355)   ondansetron injection 8 mg (8 mg Intravenous Given 1/21/25 2318)   morphine injection 4 mg (4 mg Intravenous Given 1/21/25 2328)   iohexoL (OMNIPAQUE 350) injection 75 mL (75 mLs Intravenous Given 1/22/25 0029)   morphine injection 4 mg (4 mg Intravenous Given 1/22/25 0122)   metoclopramide injection 10 mg (10 mg Intravenous Given 1/22/25 0122)     Medical Decision Making  Vitals normal. Abdominal exam notable for TTP w/o rebound or guarding. CT shows partial SBO. NGT placed. Symptoms controlled with zofran, morphine and IVF. Will admit to medicine w/ surgery consult.    Amount and/or Complexity of Data Reviewed  Labs: ordered.  Radiology: ordered.    Risk  Prescription drug management.  Decision regarding hospitalization.                                      Clinical Impression:  Final diagnoses:  [K56.609] Bowel obstruction          ED Disposition Condition    Admit                 Miles Gutierrez MD  01/22/25 7328

## 2025-01-22 NOTE — CONSULTS
.Consult    Patient ID: Lisa Do is a 64 y.o. female.    Chief Complaint: Emesis (Pt with ostomy, pt with vomiting and thinks that she has bowel obstruction. Pain is 10/10. )      HPI:  65 y/o woman present to ed with nausea vomitting and abd pain.  Has history of multiple abd surgeries in the past.  Feels better now than when she first presented and is not in any pain currently.        Review of Systems    Current Facility-Administered Medications   Medication Dose Route Frequency Provider Last Rate Last Admin    acetaminophen tablet 650 mg  650 mg Oral Q4H PRN Chiqui Dia MD        hydrALAZINE injection 5 mg  5 mg Intravenous Q6H PRN Chiqui Dia MD        lactated ringers infusion   Intravenous Continuous Chiqui Dia  mL/hr at 01/22/25 0244 New Bag at 01/22/25 0244    morphine injection 1 mg  1 mg Intravenous Q4H PRN Chiqui Dia MD        morphine injection 2 mg  2 mg Intravenous Q6H PRN Chiqui Dia MD   2 mg at 01/22/25 0420    mupirocin 2 % ointment   Nasal BID Gregor Humphrey., DO   Given at 01/22/25 0829    naloxone 0.4 mg/mL injection 0.4 mg  0.4 mg Intravenous PRN Chiqui Dia MD        ondansetron injection 4 mg  4 mg Intravenous Q6H PRN Chiqui Dia MD   4 mg at 01/22/25 0342    ondansetron injection 4 mg  4 mg Intravenous Q6H Chiqui Dia MD   4 mg at 01/22/25 0825    pantoprazole injection 40 mg  40 mg Intravenous BID Chiqui Dia MD   40 mg at 01/22/25 0825    promethazine (PHENERGAN) 12.5 mg in 0.9% NaCl 50 mL IVPB  12.5 mg Intravenous Q6H PRN Chiqui Dia MD   Stopped at 01/22/25 0403    sodium chloride 0.9% flush 10 mL  10 mL Intravenous Q12H PRN Chiqui Dia MD           Review of patient's allergies indicates:  No Known Allergies    Past Medical History:   Diagnosis Date    Cancer 2013    Colon    GERD (gastroesophageal reflux disease)     Hypertension        Past Surgical History:   Procedure  Laterality Date    APPENDECTOMY      COLON SURGERY  2013    Partial colectomy    HERNIA REPAIR      HYSTERECTOMY      TUBAL LIGATION         Family History   Problem Relation Name Age of Onset    Hypertension Mother      Hypertension Sister      Cancer Sister          Brain    Cancer Sister          Breast       Social History     Socioeconomic History    Marital status:    Tobacco Use    Smoking status: Never    Smokeless tobacco: Never   Substance and Sexual Activity    Alcohol use: No    Drug use: No    Sexual activity: Yes     Partners: Male     Social Drivers of Health     Financial Resource Strain: Low Risk  (1/22/2025)    Overall Financial Resource Strain (CARDIA)     Difficulty of Paying Living Expenses: Not very hard   Food Insecurity: No Food Insecurity (1/22/2025)    Hunger Vital Sign     Worried About Running Out of Food in the Last Year: Never true     Ran Out of Food in the Last Year: Never true   Transportation Needs: No Transportation Needs (1/22/2025)    TRANSPORTATION NEEDS     Transportation : No   Stress: No Stress Concern Present (1/22/2025)    [employer] [place] [employer] - Occupational Stress Questionnaire     Feeling of Stress : Not at all   Housing Stability: Low Risk  (1/22/2025)    Housing Stability Vital Sign     Unable to Pay for Housing in the Last Year: No     Homeless in the Last Year: No       Vitals:    01/22/25 0839   BP: 128/65   Pulse: 67   Resp: 18   Temp: 98.3 °F (36.8 °C)       PE: abd soft, nontender, distended, +parastomal hernia nontender    Assessment & Plan:   Conservative management with NG suction, ivf hydration and serial abd exams     Risks, benefits, alternatives to the procedure were discussed with the patient, who appears to understand and agree with the treatment plan.

## 2025-01-22 NOTE — SUBJECTIVE & OBJECTIVE
Past Medical History:   Diagnosis Date    Cancer 2013    Colon    GERD (gastroesophageal reflux disease)     Hypertension        Past Surgical History:   Procedure Laterality Date    APPENDECTOMY      COLON SURGERY  2013    Partial colectomy    HERNIA REPAIR      HYSTERECTOMY      TUBAL LIGATION         Review of patient's allergies indicates:  No Known Allergies    No current facility-administered medications on file prior to encounter.     Current Outpatient Medications on File Prior to Encounter   Medication Sig    pantoprazole (PROTONIX) 40 MG tablet Take 1 tablet (40 mg total) by mouth once daily.    cetirizine (ZYRTEC) 10 MG tablet Take 10 mg by mouth once daily.    doxycycline (MONODOX) 100 MG capsule Take 100 mg by mouth 2 (two) times daily.    hydroCHLOROthiazide (HYDRODIURIL) 25 MG tablet Take 25 mg by mouth once daily.     Family History       Problem Relation (Age of Onset)    Cancer Sister, Sister    Hypertension Mother, Sister          Tobacco Use    Smoking status: Never    Smokeless tobacco: Never   Substance and Sexual Activity    Alcohol use: No    Drug use: No    Sexual activity: Yes     Partners: Male     Review of Systems   Constitutional:  Positive for appetite change. Negative for activity change, chills, diaphoresis, fatigue and fever.   HENT:  Negative for congestion and sore throat.    Eyes:  Negative for visual disturbance.   Respiratory:  Negative for cough, chest tightness, shortness of breath and wheezing.    Cardiovascular:  Negative for chest pain, palpitations and leg swelling.   Gastrointestinal:  Positive for abdominal pain, nausea and vomiting. Negative for blood in stool, constipation and diarrhea.   Genitourinary:  Negative for dysuria.   Musculoskeletal:  Negative for back pain and myalgias.   Neurological:  Negative for dizziness, syncope, light-headedness and headaches.   Psychiatric/Behavioral:  The patient is not nervous/anxious.      Objective:     Vital Signs (Most  Recent):  Temp: 98.4 °F (36.9 °C) (01/21/25 2259)  Pulse: 71 (01/21/25 2331)  Resp: 18 (01/22/25 0122)  BP: (!) 148/71 (01/21/25 2331)  SpO2: 100 % (01/21/25 2331) Vital Signs (24h Range):  Temp:  [98.4 °F (36.9 °C)] 98.4 °F (36.9 °C)  Pulse:  [71-80] 71  Resp:  [14-19] 18  SpO2:  [98 %-100 %] 100 %  BP: (148-162)/(71-80) 148/71     Weight: 61.2 kg (135 lb)  Body mass index is 25.51 kg/m².     Physical Exam           Recent Results (from the past 24 hours)   CBC W/ AUTO DIFFERENTIAL    Collection Time: 01/21/25 11:17 PM   Result Value Ref Range    WBC 11.25 3.90 - 12.70 K/uL    RBC 5.44 (H) 4.00 - 5.40 M/uL    Hemoglobin 15.2 12.0 - 16.0 g/dL    Hematocrit 45.8 37.0 - 48.5 %    MCV 84 82 - 98 fL    MCH 27.9 27.0 - 31.0 pg    MCHC 33.2 32.0 - 36.0 g/dL    RDW 13.6 11.5 - 14.5 %    Platelets 325 150 - 450 K/uL    MPV 8.8 (L) 9.2 - 12.9 fL    Immature Granulocytes 0.4 0.0 - 0.5 %    Gran # (ANC) 9.2 (H) 1.8 - 7.7 K/uL    Immature Grans (Abs) 0.05 (H) 0.00 - 0.04 K/uL    Lymph # 1.3 1.0 - 4.8 K/uL    Mono # 0.7 0.3 - 1.0 K/uL    Eos # 0.0 0.0 - 0.5 K/uL    Baso # 0.05 0.00 - 0.20 K/uL    nRBC 0 0 /100 WBC    Gran % 81.4 (H) 38.0 - 73.0 %    Lymph % 11.3 (L) 18.0 - 48.0 %    Mono % 6.1 4.0 - 15.0 %    Eosinophil % 0.4 0.0 - 8.0 %    Basophil % 0.4 0.0 - 1.9 %    Differential Method Automated    Comp. Metabolic Panel    Collection Time: 01/21/25 11:17 PM   Result Value Ref Range    Sodium 140 136 - 145 mmol/L    Potassium 3.5 3.5 - 5.1 mmol/L    Chloride 101 95 - 110 mmol/L    CO2 25 23 - 29 mmol/L    Glucose 115 (H) 70 - 110 mg/dL    BUN 14 8 - 23 mg/dL    Creatinine 0.7 0.5 - 1.4 mg/dL    Calcium 10.1 8.7 - 10.5 mg/dL    Total Protein 8.6 (H) 6.0 - 8.4 g/dL    Albumin 4.4 3.5 - 5.2 g/dL    Total Bilirubin 0.5 0.1 - 1.0 mg/dL    Alkaline Phosphatase 145 40 - 150 U/L    AST 24 10 - 40 U/L    ALT 17 10 - 44 U/L    eGFR >60 >60 mL/min/1.73 m^2    Anion Gap 14 8 - 16 mmol/L   Lipase    Collection Time: 01/21/25 11:17 PM    Result Value Ref Range    Lipase 31 4 - 60 U/L   Lactic acid, plasma    Collection Time: 01/21/25 11:17 PM   Result Value Ref Range    Lactate (Lactic Acid) 1.5 0.5 - 2.2 mmol/L   Urinalysis, Reflex to Urine Culture Urine, Clean Catch    Collection Time: 01/22/25 12:11 AM    Specimen: Urine   Result Value Ref Range    Specimen UA Urine, Clean Catch     Color, UA Colorless (A) Yellow, Straw, Candi    Appearance, UA Clear Clear    pH, UA 7.0 5.0 - 8.0    Specific Gravity, UA 1.010 1.005 - 1.030    Protein, UA Negative Negative    Glucose, UA Negative Negative    Ketones, UA Negative Negative    Bilirubin (UA) Negative Negative    Occult Blood UA Negative Negative    Nitrite, UA Negative Negative    Urobilinogen, UA Negative <2.0 EU/dL    Leukocytes, UA Negative Negative       Microbiology Results (last 7 days)       ** No results found for the last 168 hours. **            Imaging Results              CT Abdomen Pelvis With IV Contrast NO Oral Contrast (Final result)  Result time 01/22/25 00:56:01      Final result by Yeimy Orozco MD (01/22/25 00:56:01)                   Impression:      1. Postsurgical changes of distal colon resection with left-sided colostomy.  Large parastomal hernia is seen containing multiple loops of small bowel which appears to be resulting in at least partial small bowel obstruction.  2. Additional mild wall thickening seen involving left lower quadrant small bowel loops could reflect short segment acute enteritis.  3. Additional findings as detailed above.      Electronically signed by: Yeimy Orozco MD  Date:    01/22/2025  Time:    00:56               Narrative:    EXAMINATION:  CT ABDOMEN PELVIS WITH IV CONTRAST    CLINICAL HISTORY:  Abdominal abscess/infection suspected;    TECHNIQUE:  Low dose axial images, sagittal and coronal reformations were obtained from the lung bases to the pubic symphysis following the IV administration of 75 mL of Omnipaque 350 .  Oral contrast was not  given.    COMPARISON:  None.    FINDINGS:  The visualized portion of the heart is unremarkable.  The lung bases are clear.    No significant hepatic abnormalities are identified.  There is no intra-or extrahepatic biliary ductal dilatation.  The gallbladder is unremarkable.  The pancreas, spleen, and adrenal glands are unremarkable.    Kidneys enhance normally with no evidence of hydronephrosis.  No abnormalities are seen along the ureteral courses.  Urinary bladder is unremarkable.  Uterus has been removed.    Postsurgical changes of distal colon resection are seen with left-sided colostomy.  There is a large parastomal hernia containing portion of the left colon and multiple loops of small bowel.  This appears to be resulting in obstruction.  Small bowel loops are dilated proximal from this level measuring upwards of 4 cm in caliber with fluid distention and small bowel feces sign seen.  There is short segment wall thickening involving the small bowel loops in the left lower quadrant outside of the hernia which could reflect short segment region of acute enteritis.  Stomach is distended with fluid and ingested material.  Appendix is not definitely visualized.  No free air or free fluid.    Aorta tapers normally.    No acute osseous abnormality identified.  No few stable vertebral body mixed sclerotic/lucent lesions are seen possibly representing hemangiomas or atypical hemangiomas.  This is most pronounced involving the L2 vertebral body.  Findings are stable since at least 2017 CT.                                       X-Ray Abdomen Flat And Erect (Final result)  Result time 01/22/25 00:06:13      Final result by Yeimy Orozco MD (01/22/25 00:06:13)                   Impression:      Nonobstructive bowel gas pattern.      Electronically signed by: Yeimy Orozco MD  Date:    01/22/2025  Time:    00:06               Narrative:    EXAMINATION:  XR ABDOMEN FLAT AND ERECT    CLINICAL HISTORY:  Unspecified  intestinal obstruction, unspecified as to partial versus complete obstruction    TECHNIQUE:  Flat and erect AP views of the abdomen were performed.    COMPARISON:  None    FINDINGS:  Nonspecific bowel gas pattern.  No evidence to suggest obstruction.  No free air identified.  Moderate volume retained stool is seen in the colon.  Clips are seen over the lower abdomen and pelvic region.  Partially visualized lung bases are clear.

## 2025-01-22 NOTE — PROGRESS NOTES
[facility]  Adult Nutrition  Progress Note    SUMMARY       Recommendations    Recommendation:  1. As GI function improves, consider initiating clear liquid diet with boost breeze BID advancing to GI soft diet as tolerated  2. If pt to remain with NGT for LIWS, monitor need for PPN of Clinamix 4.25/5 at 100mL/hr + daily IVFE to provide 1316 kcal, 102 g protein, 2400mL fluid. GIR=1.4 mg/kg/min.  3. Monitor weight/labs/NGT output  4. RD to follow to monitor nutrition status and PO intake    Goals:  Pt to meet 25-50% EEN/EPN by RD follow-up  Nutrition Goal Status: new  Communication of RD Recs:  (POC)    Assessment and Plan  Nutrition Problem  Altered GI Function    Related to (etiology):   Adenocarcinoma of the large intestine     Signs and Symptoms (as evidenced by):   NPO status  Decreased appetite  Abdominal pain/tenderness/distention  Nausea/vomiting  Potential bowel obstruction  NGT placed for LIWS     Interventions:  Collaboration by nutrition professional with other providers  Clear Liquid Diet  Parenteral Nutrition Management- PPN Clinamix 4.25/5 @ 100mL/hr    Nutrition Diagnosis Status:   New    Malnutrition Assessment  Unable to assess NFPE at this time, RD providing remote coverage    Reason for Assessment  Reason For Assessment: identified at risk by screening criteria (MST)  Diagnosis:  (no active principal problem)  General Information Comments: Pt presented to ED with vomiting, noted ostomy. Pt with recurrent Adenocarcinoma of the large intestine (s/p radiation prior to sigmoid colectomy with colostomy by Dr. Zavala at [facility] in 11/2022 followed by chemo which ended 1/2024). Noted decreased appetite, abdominal distention, abdominal pain, nausea, vomiting per chart. NGT in place for low/intermittent suction. NPO. Zcah 19- skin intact. Unable to assess NPFE at this time, RD providing remote coverage. No recent weight hx per chart.  Nutrition Discharge Planning: d/c needs to be  "determined    Nutrition/Diet History  Spiritual, Cultural Beliefs, Gnosticism Practices, Values that Affect Care:  (RYAN)  Food Allergies: NKFA  Factors Affecting Nutritional Intake: decreased appetite, abdominal distension, abdominal pain, altered gastrointestinal function, nausea/vomiting, NPO    Anthropometrics  Height: 5' 2" (157.5 cm)  Height (inches): 62 in  Weight: 60.4 kg (133 lb 2.5 oz)  Weight (lb): 133.16 lb  Weight Method: Bed Scale  Ideal Body Weight (IBW), Female: 110 lb  % Ideal Body Weight, Female (lb): 121.05 %  BMI (Calculated): 24.3  BMI Grade: 18.5-24.9 - normal  Usual Body Weight (UBW), kg:  (no recent wt hx)       Lab/Procedures/Meds  Pertinent Labs Reviewed: reviewed  Pertinent Labs Comments: CO2 21(L), Glucose 129(H), WBC 13.14(H)  Pertinent Medications Reviewed: reviewed  Pertinent Medications Comments: pantoprazole    Estimated/Assessed Needs  Weight Used For Calorie Calculations: 60.4 kg (133 lb 2.5 oz)  Energy Calorie Requirements (kcal): 1812 kcal  Energy Need Method: Kcal/kg (30)  Protein Requirements: 72-85 g (1.2-1.4 g/kg)  Weight Used For Protein Calculations: 60.4 kg (133 lb 2.5 oz)  Estimated Fluid Requirement Method: RDA Method  RDA Method (mL): 1812       Nutrition Prescription Ordered  Current Diet Order: NPO    Evaluation of Received Nutrient/Fluid Intake  I/O: 0/200  Energy Calories Required: not meeting needs  Protein Required: not meeting needs  Fluid Required: not meeting needs  Comments: LBM: 1/21  Tolerance: tolerating  % Intake of Estimated Energy Needs: 0 - 25 %  % Meal Intake: NPO    Nutrition Risk  Level of Risk/Frequency of Follow-up: moderate (2x weekly)     Monitor and Evaluation  Food and Nutrient Intake: energy intake, parenteral nutrition intake  Food and Nutrient Adminstration: diet order, enteral and parenteral nutrition administration  Physical Activity and Function: nutrition-related ADLs and IADLs  Anthropometric Measurements: weight change, body mass " index  Biochemical Data, Medical Tests and Procedures: gastrointestinal profile  Nutrition-Focused Physical Findings: overall appearance     Nutrition Follow-Up  RD Follow-up?: Yes

## 2025-01-22 NOTE — ED NOTES
Pt with Hx of SBO from adhesions due to colon Ca in remission, has been vomiting X 1 hour with abd pain. Pt denies any other symptoms    LOC: Pt is awake alert and aware of environment, oriented X3 and speaking appropriately  Appearance: Pt is in no acute distress, Pt is well groomed and clean  Skin: skin is warm and dry with normal turgor, mucus membranes are moist and pink, skin is intact with no bruising or breakdown  Muskuloskeletal: Pt moves all extremities well, there is no obvious swelling or deformities noted, pulses are intact.  Respiratory: Airway is open and patent, respirations are spontaneous and even.  Cardiac:  no edema and cap refill is <3sec  Abdomen: soft, tender and non-distended, colostomy bag noted  Neuro: Pt follows commands easily and has no obvious deficits

## 2025-01-22 NOTE — ASSESSMENT & PLAN NOTE
S/p cassandra's with resection of recurrent adenocarcinoma of the large intestine in 11/2023 ([facility]). S/p post-op chemo which ended 1/2024. Did have radiation prior to surgery in 2023. F/u with GI and oncology as scheduled outpatient.

## 2025-01-22 NOTE — ASSESSMENT & PLAN NOTE
Due to recurrent parastomal hernia (recently had one repaired by Dr. Zavlaa at [facility] 3/2024). NPO and general surgical consult placed. NGT to low/intermittent. Pain meds started and IVF. F/u on lytes and replace as needed. Monitor on tele with pulse ox for now.

## 2025-01-22 NOTE — NURSING
[facility]  Nurses Note -- 4 Eyes      1/22/2025       Skin assessed on: Q Shift      [x] No Pressure Injuries Present    [x]Prevention Measures Documented    [] Yes LDA  for Pressure Injury Previously documented     [] Yes New Pressure Injury Discovered   [] LDA for New Pressure Injury Added      Attending RN:  Cassandra Lerma RN     Second RN:  GER Estrada

## 2025-01-23 PROBLEM — E87.6 HYPOKALEMIA: Status: ACTIVE | Noted: 2025-01-23

## 2025-01-23 LAB
ANION GAP SERPL CALC-SCNC: 10 MMOL/L (ref 8–16)
BASOPHILS # BLD AUTO: 0.02 K/UL (ref 0–0.2)
BASOPHILS NFR BLD: 0.3 % (ref 0–1.9)
BUN SERPL-MCNC: 17 MG/DL (ref 8–23)
CALCIUM SERPL-MCNC: 9.2 MG/DL (ref 8.7–10.5)
CHLORIDE SERPL-SCNC: 109 MMOL/L (ref 95–110)
CO2 SERPL-SCNC: 24 MMOL/L (ref 23–29)
CREAT SERPL-MCNC: 0.6 MG/DL (ref 0.5–1.4)
DIFFERENTIAL METHOD BLD: ABNORMAL
EOSINOPHIL # BLD AUTO: 0.1 K/UL (ref 0–0.5)
EOSINOPHIL NFR BLD: 2 % (ref 0–8)
ERYTHROCYTE [DISTWIDTH] IN BLOOD BY AUTOMATED COUNT: 13.9 % (ref 11.5–14.5)
EST. GFR  (NO RACE VARIABLE): >60 ML/MIN/1.73 M^2
GLUCOSE SERPL-MCNC: 95 MG/DL (ref 70–110)
HCT VFR BLD AUTO: 37.5 % (ref 37–48.5)
HGB BLD-MCNC: 11.9 G/DL (ref 12–16)
IMM GRANULOCYTES # BLD AUTO: 0.02 K/UL (ref 0–0.04)
IMM GRANULOCYTES NFR BLD AUTO: 0.3 % (ref 0–0.5)
LYMPHOCYTES # BLD AUTO: 1.5 K/UL (ref 1–4.8)
LYMPHOCYTES NFR BLD: 23.4 % (ref 18–48)
MAGNESIUM SERPL-MCNC: 2 MG/DL (ref 1.6–2.6)
MCH RBC QN AUTO: 27.4 PG (ref 27–31)
MCHC RBC AUTO-ENTMCNC: 31.7 G/DL (ref 32–36)
MCV RBC AUTO: 86 FL (ref 82–98)
MONOCYTES # BLD AUTO: 0.6 K/UL (ref 0.3–1)
MONOCYTES NFR BLD: 8.5 % (ref 4–15)
NEUTROPHILS # BLD AUTO: 4.2 K/UL (ref 1.8–7.7)
NEUTROPHILS NFR BLD: 65.5 % (ref 38–73)
NRBC BLD-RTO: 0 /100 WBC
PHOSPHATE SERPL-MCNC: 3 MG/DL (ref 2.7–4.5)
PLATELET # BLD AUTO: 241 K/UL (ref 150–450)
PMV BLD AUTO: 9.1 FL (ref 9.2–12.9)
POTASSIUM SERPL-SCNC: 3.3 MMOL/L (ref 3.5–5.1)
RBC # BLD AUTO: 4.35 M/UL (ref 4–5.4)
SODIUM SERPL-SCNC: 143 MMOL/L (ref 136–145)
WBC # BLD AUTO: 6.45 K/UL (ref 3.9–12.7)

## 2025-01-23 PROCEDURE — 63600175 PHARM REV CODE 636 W HCPCS: Performed by: STUDENT IN AN ORGANIZED HEALTH CARE EDUCATION/TRAINING PROGRAM

## 2025-01-23 PROCEDURE — 83735 ASSAY OF MAGNESIUM: CPT | Performed by: STUDENT IN AN ORGANIZED HEALTH CARE EDUCATION/TRAINING PROGRAM

## 2025-01-23 PROCEDURE — 84100 ASSAY OF PHOSPHORUS: CPT | Performed by: STUDENT IN AN ORGANIZED HEALTH CARE EDUCATION/TRAINING PROGRAM

## 2025-01-23 PROCEDURE — 85025 COMPLETE CBC W/AUTO DIFF WBC: CPT | Performed by: STUDENT IN AN ORGANIZED HEALTH CARE EDUCATION/TRAINING PROGRAM

## 2025-01-23 PROCEDURE — 80048 BASIC METABOLIC PNL TOTAL CA: CPT | Performed by: STUDENT IN AN ORGANIZED HEALTH CARE EDUCATION/TRAINING PROGRAM

## 2025-01-23 PROCEDURE — 99232 SBSQ HOSP IP/OBS MODERATE 35: CPT | Mod: ,,, | Performed by: SURGERY

## 2025-01-23 PROCEDURE — 11000001 HC ACUTE MED/SURG PRIVATE ROOM

## 2025-01-23 PROCEDURE — 25000003 PHARM REV CODE 250: Performed by: INTERNAL MEDICINE

## 2025-01-23 PROCEDURE — 36415 COLL VENOUS BLD VENIPUNCTURE: CPT | Performed by: STUDENT IN AN ORGANIZED HEALTH CARE EDUCATION/TRAINING PROGRAM

## 2025-01-23 PROCEDURE — 63600175 PHARM REV CODE 636 W HCPCS: Performed by: INTERNAL MEDICINE

## 2025-01-23 RX ORDER — POTASSIUM CHLORIDE 7.45 MG/ML
10 INJECTION INTRAVENOUS
Status: COMPLETED | OUTPATIENT
Start: 2025-01-23 | End: 2025-01-23

## 2025-01-23 RX ADMIN — POTASSIUM CHLORIDE 10 MEQ: 7.46 INJECTION, SOLUTION INTRAVENOUS at 08:01

## 2025-01-23 RX ADMIN — PANTOPRAZOLE SODIUM 40 MG: 40 INJECTION, POWDER, LYOPHILIZED, FOR SOLUTION INTRAVENOUS at 08:01

## 2025-01-23 RX ADMIN — MUPIROCIN: 20 OINTMENT TOPICAL at 08:01

## 2025-01-23 RX ADMIN — ACETAMINOPHEN 650 MG: 325 TABLET ORAL at 08:01

## 2025-01-23 RX ADMIN — POTASSIUM CHLORIDE 10 MEQ: 7.46 INJECTION, SOLUTION INTRAVENOUS at 06:01

## 2025-01-23 NOTE — HOSPITAL COURSE
65 y/o F with history of Recurrent Adenocarcinoma of the large intestine and SBO in the past due to adhesions admitted on 01/12/2025 for partial small-bowel obstruction.  Presented with abdominal pain associated with nausea and vomiting.CT abd/pelvis with contrast showed large parastomal hernia  containing multiple loops of small bowel which appears to be resulting in at least partial small bowel obstruction. NGT placed in the ED. General surgery consulted-recommend conservative management with NG suction, ivf hydration and serial abd exams. Clinically improving, NGT discontinued on 01/24 and diet was advanced.  Patient tolerated clear liquid diet and regular diet.  No longer having any abdominal pain, nausea, vomiting.  Having stools in her colostomy.    Pt denies any fever, chest pain, shortness of breath, palpitations, abdominal pain, nausea, vomiting, or any new weaknesses. Feels ready to go home. Patient's exam on discharge was as follow: Patient is alert and oriented, appears in no acute distress, heart with regular rate and rhythm, lungs clear to asculation with non-labored breathing, abdomen soft, nontender, stool seen in colostomy bag, and no new weaknesses or focal deficits seen. Bilateral lower extremities without any edema or calf tenderness.     Patient was counseled regarding any abnormal labs, differential diagnosis, treatment options, risk-benefit, lifestyle changes, prognosis, current condition, and medications. Patient was interactive and attentive.  Patient's questions were answered in a respectful and timely manner. Patient was instructed to follow-up with PCP within 1 week and to continue taking medications as prescribed.  Also, extensively discussed the risks, benefits, and side effects of patient's medications. Discussed with patient about any medication changes. Patient verbalized understanding and agrees to treatment plan.  Patient is stable for discharge.  Patient has no other questions or  concerns at this time.  Also called and spoke with patient's daughter on the phone for the entire discussion.  ED precautions discussed with the patient.    Vital signs are stable. Ambulating without any difficulty. Tolerating p.o. intake without any nausea or vomiting. Afebrile for over 24 hours. Patient is in stable condition and has no questions or concerns. Patient will be discharge to home once transportation secured . CM/SW to assist with discharge planning.     Vitals:    01/25/25 0015 01/25/25 0552 01/25/25 0828 01/25/25 1020   BP: 122/64 124/69 (!) 160/75    Pulse: 61 (!) 56 75 71   Resp: 14 16 19 18   Temp: 98 °F (36.7 °C) 98.2 °F (36.8 °C)     TempSrc:       SpO2: 97% 98%  96%   Weight:       Height:

## 2025-01-23 NOTE — ASSESSMENT & PLAN NOTE
Patient's blood pressure range in the last 24 hours was: BP  Min: 121/58  Max: 150/72.The patient's inpatient anti-hypertensive regimen is listed below:  Current Antihypertensives  hydrALAZINE injection 5 mg, Every 6 hours PRN, Intravenous    Plan  - HOLD HCTZ for now given N/V and anticipated NPO status.   -Control Pain  -PRN hydralazine till able to resume home BP meds.   -BP controlled

## 2025-01-23 NOTE — ASSESSMENT & PLAN NOTE
Patient's most recent potassium results are listed below.   Recent Labs     01/21/25  2317 01/22/25  0609 01/23/25  0339   K 3.5 3.7 3.3*     Plan  - Replete potassium per protocol  - Monitor potassium Daily  - Patient's hypokalemia is worsening. Will replace as needed  - monitor and replace as needed

## 2025-01-23 NOTE — PROGRESS NOTES
65 y/o with ostomy, parastomal hernia and SBO. No c/o this am, reports feeling better    No output in ostomy    Abd soft, nt    Plan: recommend continued NG suction, ivf hydration, continue serial abd exams.

## 2025-01-23 NOTE — ASSESSMENT & PLAN NOTE
Due to recurrent parastomal hernia (recently had one repaired by Dr. Zavala at [facility] 3/2024).   NPO, NGT placed in the ED  general surgical consult placed: continue NGT to low/intermittent.   Pain meds started and IVF.   F/u on lytes and replace as needed.   Monitor on tele with pulse ox for now.

## 2025-01-23 NOTE — SUBJECTIVE & OBJECTIVE
Interval History:  No acute overnight events.  Patient remained afebrile.  States she has some stool in her colostomy bag that has been change.  Currently without any stools in colostomy bag.  Denies any abdominal pain, nausea, vomiting.  Surgery recommend to continue NGT at this time.    Review of Systems   Constitutional:  Negative for fever.   Respiratory:  Negative for cough and shortness of breath.    Gastrointestinal:  Positive for constipation. Negative for abdominal pain, nausea and vomiting.   Genitourinary:  Negative for difficulty urinating.   Neurological:  Negative for weakness.     Objective:     Vital Signs (Most Recent):  Temp: 97.8 °F (36.6 °C) (01/23/25 1100)  Pulse: 64 (01/23/25 0800)  Resp: 17 (01/23/25 0800)  BP: (!) 145/68 (01/23/25 0800)  SpO2: 97 % (01/23/25 0800) Vital Signs (24h Range):  Temp:  [97.5 °F (36.4 °C)-98.6 °F (37 °C)] 97.8 °F (36.6 °C)  Pulse:  [59-77] 64  Resp:  [14-17] 17  SpO2:  [96 %-98 %] 97 %  BP: (121-150)/(58-73) 145/68     Weight: 60.4 kg (133 lb 2.5 oz)  Body mass index is 24.35 kg/m².    Intake/Output Summary (Last 24 hours) at 1/23/2025 1331  Last data filed at 1/23/2025 0624  Gross per 24 hour   Intake 190 ml   Output 800 ml   Net -610 ml         Physical Exam  Vitals and nursing note reviewed.   Constitutional:       General: She is not in acute distress.     Appearance: She is not ill-appearing.   HENT:      Mouth/Throat:      Comments: NGT in place  Cardiovascular:      Rate and Rhythm: Normal rate and regular rhythm.   Pulmonary:      Effort: Pulmonary effort is normal. No respiratory distress.      Breath sounds: Normal breath sounds.   Abdominal:      General: There is no distension.      Palpations: Abdomen is soft.      Tenderness: There is no abdominal tenderness.      Comments: Colostomy bag without stool   Musculoskeletal:      Right lower leg: No edema.      Left lower leg: No edema.   Skin:     General: Skin is warm and dry.   Neurological:       General: No focal deficit present.      Mental Status: She is alert and oriented to person, place, and time.   Psychiatric:         Mood and Affect: Mood normal.         Thought Content: Thought content normal.             Significant Labs: All pertinent labs within the past 24 hours have been reviewed.    Significant Imaging: I have reviewed all pertinent imaging results/findings within the past 24 hours.

## 2025-01-23 NOTE — PROGRESS NOTES
[facility] Medicine  Progress Note    Patient Name: Lisa Do  MRN: [id number]  Patient Class: IP- Inpatient   Admission Date: 1/21/2025  Length of Stay: 1 days  Attending Physician: Gregor Humphrey DO  Primary Care Provider: Jose Echevarria MD        Subjective     Principal Problem:SBO (small bowel obstruction)        HPI:  64 y.o. woman with h/o GERD, Recurrent Adenocarcinoma of the large intestine(s/p radiation prior to sigmoid colectomy with colostomy by Dr. Zavala at [facility] in 11/2022 followed by chemo which ended 1/2024), h/o SBO in the past due to adhesions (s/p JONY and repiar of parastomal hernia 3/2024) , Essential HTN  presents to the Er with c/o N/V x1 hours with associated abomdinal pain. No fevers or chills. NO chest pain or SOB/Najera. Was recently admitted to [facility] on 12/22/2024 with SBO which resolved with bowl rest and IVF.     Work up in the ER noted for WBC 11 and H/H 15/45. Lytes with normal renal and liver function. Lipase wnl.   UA not c/w acute UTI.     CT abd/pelvis with IV contrast:   1. Postsurgical changes of distal colon resection with left-sided colostomy.  Large parastomal hernia is seen containing multiple loops of small bowel which appears to be resulting in at least partial small bowel obstruction.   2. Additional mild wall thickening seen involving left lower quadrant small bowel loops could reflect short segment acute enteritis.     IV pain meds and IVF started. NGT placed by ER. We have been consulted for further management.     Overview/Hospital Course:  65 y/o F with history of Recurrent Adenocarcinoma of the large intestine and SBO in the past due to adhesions admitted on 01/12/2025 for partial small-bowel obstruction.  Presented with abdominal pain associated with nausea and vomiting.CT abd/pelvis with contrast showed large parastomal hernia  containing multiple loops of small bowel which appears to be resulting in at least partial small bowel  obstruction. NGT placed in the ED. General surgery consulted-recommend conservative management with NG suction, ivf hydration and serial abd exams.    Interval History:  No acute overnight events.  Patient remained afebrile.  States she has some stool in her colostomy bag that has been change.  Currently without any stools in colostomy bag.  Denies any abdominal pain, nausea, vomiting.  Surgery recommend to continue NGT at this time.    Review of Systems   Constitutional:  Negative for fever.   Respiratory:  Negative for cough and shortness of breath.    Gastrointestinal:  Positive for constipation. Negative for abdominal pain, nausea and vomiting.   Genitourinary:  Negative for difficulty urinating.   Neurological:  Negative for weakness.     Objective:     Vital Signs (Most Recent):  Temp: 97.8 °F (36.6 °C) (01/23/25 1100)  Pulse: 64 (01/23/25 0800)  Resp: 17 (01/23/25 0800)  BP: (!) 145/68 (01/23/25 0800)  SpO2: 97 % (01/23/25 0800) Vital Signs (24h Range):  Temp:  [97.5 °F (36.4 °C)-98.6 °F (37 °C)] 97.8 °F (36.6 °C)  Pulse:  [59-77] 64  Resp:  [14-17] 17  SpO2:  [96 %-98 %] 97 %  BP: (121-150)/(58-73) 145/68     Weight: 60.4 kg (133 lb 2.5 oz)  Body mass index is 24.35 kg/m².    Intake/Output Summary (Last 24 hours) at 1/23/2025 1331  Last data filed at 1/23/2025 0624  Gross per 24 hour   Intake 190 ml   Output 800 ml   Net -610 ml         Physical Exam  Vitals and nursing note reviewed.   Constitutional:       General: She is not in acute distress.     Appearance: She is not ill-appearing.   HENT:      Mouth/Throat:      Comments: NGT in place  Cardiovascular:      Rate and Rhythm: Normal rate and regular rhythm.   Pulmonary:      Effort: Pulmonary effort is normal. No respiratory distress.      Breath sounds: Normal breath sounds.   Abdominal:      General: There is no distension.      Palpations: Abdomen is soft.      Tenderness: There is no abdominal tenderness.      Comments: Colostomy bag without stool    Musculoskeletal:      Right lower leg: No edema.      Left lower leg: No edema.   Skin:     General: Skin is warm and dry.   Neurological:      General: No focal deficit present.      Mental Status: She is alert and oriented to person, place, and time.   Psychiatric:         Mood and Affect: Mood normal.         Thought Content: Thought content normal.             Significant Labs: All pertinent labs within the past 24 hours have been reviewed.    Significant Imaging: I have reviewed all pertinent imaging results/findings within the past 24 hours.    Assessment and Plan     * SBO (small bowel obstruction)  Due to recurrent parastomal hernia ([facility]   NPO, NGT placed in the ED  general surgical consult placed: continue NGT to low/intermittent.   Pain meds started and IVF.   F/u on lytes and replace as needed.   Monitor on tele with pulse ox for now.       Essential hypertension, benign  Patient's blood pressure range in the last 24 hours was: BP  Min: 121/58  Max: 150/72.The patient's inpatient anti-hypertensive regimen is listed below:  Current Antihypertensives  hydrALAZINE injection 5 mg, Every 6 hours PRN, Intravenous    Plan  - HOLD HCTZ for now given N/V and anticipated NPO status.   -Control Pain  -PRN hydralazine till able to resume home BP meds.   -BP controlled    History of colon cancer  S/p cassandra's with resection of recurrent adenocarcinoma of the large intestine in 11/2023 ([facility]). S/p post-op chemo which ended 1/2024. Did have radiation prior to surgery in 2023. F/u with GI and oncology as scheduled outpatient.       Gastroesophageal reflux disease  PPI daily       Hypokalemia  Patient's most recent potassium results are listed below.   Recent Labs     01/21/25  2317 01/22/25  0609 01/23/25  0339   K 3.5 3.7 3.3*     Plan  - Replete potassium per protocol  - Monitor potassium Daily  - Patient's hypokalemia is worsening. Will replace as needed  -  monitor and replace as needed      VTE Risk Mitigation (From admission, onward)           Ordered     IP VTE LOW RISK PATIENT  Once         01/22/25 0111     Place sequential compression device  Until discontinued         01/22/25 0111                    Discharge Planning   MICHAEL:      Code Status: Full Code   Medical Readiness for Discharge Date:                            Gregor Humphrey DO  Department of Hospital Medicine   [facility] [place] - Intensive Care

## 2025-01-23 NOTE — PLAN OF CARE
Case Management Assessment     PCP: Jose Echevarria MD    Pharmacy:   [facility] [id number] [facility] [employer] - [place] [address] [facility]  [address] [address]  Phone: [phone] Fax: [phone]    Patient Arrived From: Home lives with spouse  Existing Help at Home: Spouse and adult children     Barriers to Discharge: none    Discharge Plan:    A. Home   B. Home with family     CM spoke with patient's daughterLisa to complete case management initial assessment. Pt's family to provide transportation home at discharge. This patient has been screened for Case Management needs.  Treatment is ongoing in the ICU at this time.  CM contact information given to patient/family.  CM will continue to follow while in the ICU and assist with DC planning as needed.        01/23/25 1400   Discharge Assessment   Assessment Type Discharge Planning Assessment   Confirmed/corrected address, phone number and insurance Yes   Source of Information family;health record   Communicated MICHAEL with patient/caregiver Date not available/Unable to determine   People in Home spouse   Do you expect to return to your current living situation? Yes   Do you have help at home or someone to help you manage your care at home? Yes   Current cognitive status: Alert/Oriented   Walking or Climbing Stairs Difficulty no   Dressing/Bathing Difficulty no   Equipment Currently Used at Home other (see comments)  (Pt currently has ostomy/supplies)   Readmission within 30 days? No   Patient currently being followed by outpatient case management? No   Do you currently have service(s) that help you manage your care at home? No   Do you take prescription medications? Yes   Do you have prescription coverage? Yes   Do you have any problems affording any of your prescribed medications? No   Is the patient taking medications as prescribed? yes   How do you get to doctors appointments? family or friend will provide   Are you on dialysis? No   Do you take  coumadin? No   Discharge Plan A Home   Discharge Plan B Home with family   DME Needed Upon Discharge  other (see comments)  (tbd)   Discharge Plan discussed with: Adult children   Transition of Care Barriers None

## 2025-01-24 LAB
ANION GAP SERPL CALC-SCNC: 15 MMOL/L (ref 8–16)
BUN SERPL-MCNC: 18 MG/DL (ref 8–23)
CALCIUM SERPL-MCNC: 9.2 MG/DL (ref 8.7–10.5)
CHLORIDE SERPL-SCNC: 105 MMOL/L (ref 95–110)
CO2 SERPL-SCNC: 20 MMOL/L (ref 23–29)
CREAT SERPL-MCNC: 0.6 MG/DL (ref 0.5–1.4)
EST. GFR  (NO RACE VARIABLE): >60 ML/MIN/1.73 M^2
GLUCOSE SERPL-MCNC: 60 MG/DL (ref 70–110)
MAGNESIUM SERPL-MCNC: 1.9 MG/DL (ref 1.6–2.6)
PHOSPHATE SERPL-MCNC: 3.5 MG/DL (ref 2.7–4.5)
POTASSIUM SERPL-SCNC: 2.9 MMOL/L (ref 3.5–5.1)
POTASSIUM SERPL-SCNC: 3.4 MMOL/L (ref 3.5–5.1)
SODIUM SERPL-SCNC: 140 MMOL/L (ref 136–145)

## 2025-01-24 PROCEDURE — 63600175 PHARM REV CODE 636 W HCPCS: Performed by: STUDENT IN AN ORGANIZED HEALTH CARE EDUCATION/TRAINING PROGRAM

## 2025-01-24 PROCEDURE — 80048 BASIC METABOLIC PNL TOTAL CA: CPT | Performed by: STUDENT IN AN ORGANIZED HEALTH CARE EDUCATION/TRAINING PROGRAM

## 2025-01-24 PROCEDURE — 36415 COLL VENOUS BLD VENIPUNCTURE: CPT | Performed by: STUDENT IN AN ORGANIZED HEALTH CARE EDUCATION/TRAINING PROGRAM

## 2025-01-24 PROCEDURE — 84100 ASSAY OF PHOSPHORUS: CPT | Performed by: STUDENT IN AN ORGANIZED HEALTH CARE EDUCATION/TRAINING PROGRAM

## 2025-01-24 PROCEDURE — 99232 SBSQ HOSP IP/OBS MODERATE 35: CPT | Mod: ,,, | Performed by: SURGERY

## 2025-01-24 PROCEDURE — 84132 ASSAY OF SERUM POTASSIUM: CPT | Performed by: STUDENT IN AN ORGANIZED HEALTH CARE EDUCATION/TRAINING PROGRAM

## 2025-01-24 PROCEDURE — 63600175 PHARM REV CODE 636 W HCPCS: Performed by: INTERNAL MEDICINE

## 2025-01-24 PROCEDURE — 11000001 HC ACUTE MED/SURG PRIVATE ROOM

## 2025-01-24 PROCEDURE — 83735 ASSAY OF MAGNESIUM: CPT | Performed by: STUDENT IN AN ORGANIZED HEALTH CARE EDUCATION/TRAINING PROGRAM

## 2025-01-24 PROCEDURE — 25000003 PHARM REV CODE 250: Performed by: STUDENT IN AN ORGANIZED HEALTH CARE EDUCATION/TRAINING PROGRAM

## 2025-01-24 RX ORDER — ACETAMINOPHEN 650 MG/1
650 SUPPOSITORY RECTAL EVERY 6 HOURS PRN
Status: DISCONTINUED | OUTPATIENT
Start: 2025-01-24 | End: 2025-01-25 | Stop reason: HOSPADM

## 2025-01-24 RX ORDER — DEXTROSE, SODIUM CHLORIDE, SODIUM LACTATE, POTASSIUM CHLORIDE, AND CALCIUM CHLORIDE 5; .6; .31; .03; .02 G/100ML; G/100ML; G/100ML; G/100ML; G/100ML
INJECTION, SOLUTION INTRAVENOUS CONTINUOUS
Status: ACTIVE | OUTPATIENT
Start: 2025-01-24 | End: 2025-01-25

## 2025-01-24 RX ORDER — POTASSIUM CHLORIDE 7.45 MG/ML
10 INJECTION INTRAVENOUS
Status: COMPLETED | OUTPATIENT
Start: 2025-01-24 | End: 2025-01-24

## 2025-01-24 RX ADMIN — MUPIROCIN: 20 OINTMENT TOPICAL at 08:01

## 2025-01-24 RX ADMIN — PANTOPRAZOLE SODIUM 40 MG: 40 INJECTION, POWDER, LYOPHILIZED, FOR SOLUTION INTRAVENOUS at 09:01

## 2025-01-24 RX ADMIN — SODIUM CHLORIDE, SODIUM LACTATE, POTASSIUM CHLORIDE, CALCIUM CHLORIDE AND DEXTROSE MONOHYDRATE: 5; 600; 310; 30; 20 INJECTION, SOLUTION INTRAVENOUS at 06:01

## 2025-01-24 RX ADMIN — POTASSIUM CHLORIDE 10 MEQ: 7.46 INJECTION, SOLUTION INTRAVENOUS at 06:01

## 2025-01-24 RX ADMIN — ACETAMINOPHEN 650 MG: 650 SUPPOSITORY RECTAL at 11:01

## 2025-01-24 RX ADMIN — MUPIROCIN: 20 OINTMENT TOPICAL at 09:01

## 2025-01-24 RX ADMIN — POTASSIUM BICARBONATE 50 MEQ: 977.5 TABLET, EFFERVESCENT ORAL at 06:01

## 2025-01-24 RX ADMIN — POTASSIUM CHLORIDE 10 MEQ: 7.46 INJECTION, SOLUTION INTRAVENOUS at 09:01

## 2025-01-24 RX ADMIN — POTASSIUM CHLORIDE 10 MEQ: 7.46 INJECTION, SOLUTION INTRAVENOUS at 07:01

## 2025-01-24 RX ADMIN — PANTOPRAZOLE SODIUM 40 MG: 40 INJECTION, POWDER, LYOPHILIZED, FOR SOLUTION INTRAVENOUS at 08:01

## 2025-01-24 RX ADMIN — POTASSIUM CHLORIDE 10 MEQ: 7.46 INJECTION, SOLUTION INTRAVENOUS at 08:01

## 2025-01-24 NOTE — NURSING
Pt ambulated to bathroom independently and back in bed, remained free from fall/injury. No complaints of pain/discomfort. IV saline lock.  at bedside. NGT LIWS. Colostomy. Safety measures maintained. Will cont to monitor   81

## 2025-01-24 NOTE — ASSESSMENT & PLAN NOTE
Patient's blood pressure range in the last 24 hours was: BP  Min: 108/56  Max: 132/64.The patient's inpatient anti-hypertensive regimen is listed below:  Current Antihypertensives  hydrALAZINE injection 5 mg, Every 6 hours PRN, Intravenous    Plan  - HOLD HCTZ for now given N/V and anticipated NPO status.   -Control Pain  -PRN hydralazine till able to resume home BP meds.   -BP controlled

## 2025-01-24 NOTE — ASSESSMENT & PLAN NOTE
Due to recurrent parastomal hernia (recently had one repaired by Dr. Zavala at [facility] 3/2024).   NPO, NGT placed in the ED  general surgical consult placed: NGT clamped this morning, consider removal later today   Pain meds started and IVF.   F/u on lytes and replace as needed.   Monitor on tele with pulse ox for now.

## 2025-01-24 NOTE — PROGRESS NOTES
Surgery Progress Note    Lisa Do is a 64 y.o. year old female with h/o GERD, Recurrent Adenocarcinoma of the large intestine(s/p radiation prior to sigmoid colectomy with colostomy by Dr. Zavala at [facility] in 11/2022 followed by chemo which ended 1/2024), h/o SBO in the past due to adhesions (s/p JONY and repiar of parastomal hernia 3/2024) , Essential HTN who presented to hospital with abd pain, n/v c/f recurrent SBO.     Interval:  NAEON AF VSS  Feeling well this morning  No abdominal pain, nausea/vomiting  No stool in ostomy bag but some air  No f/c/n/v/sob/cp  NGT 350cc light gastric output    ROS:  Negative except above    PE:  Vitals:    01/23/25 2300 01/24/25 0700 01/24/25 0721 01/24/25 0800   BP: (!) 108/56  130/65 132/64   Pulse: 60 75  66   Resp: 14 14  12   Temp:  98.4 °F (36.9 °C)     TempSrc:  Oral     SpO2: 95% 96%  97%   Weight:       Height:           NAD  No belabored breathing  No skin changes  Abd soft nt nd  Ostomy p/p/p with air in bag, no stool    Significant Diagnostic Studies: Labs: BMP:   Recent Labs   Lab 01/23/25  0339 01/24/25  0421   GLU 95 60*    140   K 3.3* 2.9*    105   CO2 24 20*   BUN 17 18   CREATININE 0.6 0.6   CALCIUM 9.2 9.2   MG 2.0 1.9   , CBC   Recent Labs   Lab 01/23/25  0339   WBC 6.45   HGB 11.9*   HCT 37.5      , and All labs within the past 24 hours have been reviewed  Radiology: X-Ray: KUB: X-Ray Abdomen AP 1 View (KUB): No results found for this visit on 01/21/25.  CT scan: CT ABDOMEN PELVIS WITH CONTRAST:   Results for orders placed or performed during the hospital encounter of 01/21/25   CT Abdomen Pelvis With IV Contrast NO Oral Contrast    Narrative    EXAMINATION:  CT ABDOMEN PELVIS WITH IV CONTRAST    CLINICAL HISTORY:  Abdominal abscess/infection suspected;    TECHNIQUE:  Low dose axial images, sagittal and coronal reformations were obtained from the lung bases to the pubic symphysis following the IV administration of 75 mL of  Omnipaque 350 .  Oral contrast was not given.    COMPARISON:  None.    FINDINGS:  The visualized portion of the heart is unremarkable.  The lung bases are clear.    No significant hepatic abnormalities are identified.  There is no intra-or extrahepatic biliary ductal dilatation.  The gallbladder is unremarkable.  The pancreas, spleen, and adrenal glands are unremarkable.    Kidneys enhance normally with no evidence of hydronephrosis.  No abnormalities are seen along the ureteral courses.  Urinary bladder is unremarkable.  Uterus has been removed.    Postsurgical changes of distal colon resection are seen with left-sided colostomy.  There is a large parastomal hernia containing portion of the left colon and multiple loops of small bowel.  This appears to be resulting in obstruction.  Small bowel loops are dilated proximal from this level measuring upwards of 4 cm in caliber with fluid distention and small bowel feces sign seen.  There is short segment wall thickening involving the small bowel loops in the left lower quadrant outside of the hernia which could reflect short segment region of acute enteritis.  Stomach is distended with fluid and ingested material.  Appendix is not definitely visualized.  No free air or free fluid.    Aorta tapers normally.    No acute osseous abnormality identified.  No few stable vertebral body mixed sclerotic/lucent lesions are seen possibly representing hemangiomas or atypical hemangiomas.  This is most pronounced involving the L2 vertebral body.  Findings are stable since at least 2017 CT.      Impression    1. Postsurgical changes of distal colon resection with left-sided colostomy.  Large parastomal hernia is seen containing multiple loops of small bowel which appears to be resulting in at least partial small bowel obstruction.  2. Additional mild wall thickening seen involving left lower quadrant small bowel loops could reflect short segment acute enteritis.  3. Additional  findings as detailed above.      Electronically signed by: Yeimy Orozco MD  Date:    01/22/2025  Time:    00:56       A/P:  Lisa Do is a 64 y.o. year old female  with abd pain, n/v with concern for recurrent SBO. S/p NGT decompression, bowel rest    -No acute surgical intervention  -NGT clamped this morning, consider removal later today  -Once removed, can advance to CLD  -Serial abd exams  -Strict I/Os   -Rest of care per primary team    Priya Dyer  General Surgery - [facility]  1/24/2025

## 2025-01-24 NOTE — ASSESSMENT & PLAN NOTE
Patient's most recent potassium results are listed below.   Recent Labs     01/22/25  0609 01/23/25  0339 01/24/25  0421   K 3.7 3.3* 2.9*       Plan  - Replete potassium per protocol  - Monitor potassium Daily  - Patient's hypokalemia is worsening. Will replace as needed  - monitor and replace as needed

## 2025-01-24 NOTE — SUBJECTIVE & OBJECTIVE
Interval History:  No acute overnight events.  Patient remained afebrile.  no stool in her colostomy bag this AM or overnight.    Denies any abdominal pain, nausea, vomiting.  Surgery plans to clamp NGT today, possible removal later today     Review of Systems   Constitutional:  Negative for fever.   Respiratory:  Negative for cough and shortness of breath.    Gastrointestinal:  Negative for abdominal pain, nausea and vomiting.   Genitourinary:  Negative for difficulty urinating.   Neurological:  Negative for weakness.     Objective:     Vital Signs (Most Recent):  Temp: 98.4 °F (36.9 °C) (01/24/25 0700)  Pulse: 66 (01/24/25 0800)  Resp: 12 (01/24/25 0800)  BP: 132/64 (01/24/25 0800)  SpO2: 97 % (01/24/25 0800) Vital Signs (24h Range):  Temp:  [97.8 °F (36.6 °C)-98.4 °F (36.9 °C)] 98.4 °F (36.9 °C)  Pulse:  [60-75] 66  Resp:  [12-14] 12  SpO2:  [95 %-98 %] 97 %  BP: (108-132)/(56-67) 132/64     Weight: 60.4 kg (133 lb 2.5 oz)  Body mass index is 24.35 kg/m².    Intake/Output Summary (Last 24 hours) at 1/24/2025 1053  Last data filed at 1/24/2025 0944  Gross per 24 hour   Intake 656.96 ml   Output 350 ml   Net 306.96 ml         Physical Exam  Vitals and nursing note reviewed.   Constitutional:       General: She is not in acute distress.     Appearance: She is not ill-appearing.   HENT:      Mouth/Throat:      Comments: NGT in place  Cardiovascular:      Rate and Rhythm: Normal rate and regular rhythm.   Pulmonary:      Effort: Pulmonary effort is normal. No respiratory distress.      Breath sounds: Normal breath sounds.   Abdominal:      General: There is no distension.      Palpations: Abdomen is soft.      Tenderness: There is no abdominal tenderness.      Comments: Colostomy bag without stool   Musculoskeletal:      Right lower leg: No edema.      Left lower leg: No edema.   Skin:     General: Skin is warm and dry.   Neurological:      General: No focal deficit present.      Mental Status: She is alert and  oriented to person, place, and time.   Psychiatric:         Mood and Affect: Mood normal.         Thought Content: Thought content normal.             Significant Labs: All pertinent labs within the past 24 hours have been reviewed.    Significant Imaging: I have reviewed all pertinent imaging results/findings within the past 24 hours.

## 2025-01-24 NOTE — PROGRESS NOTES
[facility] Medicine  Progress Note    Patient Name: Lisa Do  MRN: [id number]  Patient Class: IP- Inpatient   Admission Date: 1/21/2025  Length of Stay: 2 days  Attending Physician: Gregor Humphrey DO  Primary Care Provider: Jose Echevarria MD        Subjective     Principal Problem:SBO (small bowel obstruction)        HPI:  64 y.o. woman with h/o GERD, Recurrent Adenocarcinoma of the large intestine(s/p radiation prior to sigmoid colectomy with colostomy by Dr. Zavala at [facility] in 11/2022 followed by chemo which ended 1/2024), h/o SBO in the past due to adhesions (s/p JONY and repiar of parastomal hernia 3/2024) , Essential HTN  presents to the Er with c/o N/V x1 hours with associated abomdinal pain. No fevers or chills. NO chest pain or SOB/Najera. Was recently admitted to [facility] on 12/22/2024 with SBO which resolved with bowl rest and IVF.     Work up in the ER noted for WBC 11 and H/H 15/45. Lytes with normal renal and liver function. Lipase wnl.   UA not c/w acute UTI.     CT abd/pelvis with IV contrast:   1. Postsurgical changes of distal colon resection with left-sided colostomy.  Large parastomal hernia is seen containing multiple loops of small bowel which appears to be resulting in at least partial small bowel obstruction.   2. Additional mild wall thickening seen involving left lower quadrant small bowel loops could reflect short segment acute enteritis.     IV pain meds and IVF started. NGT placed by ER. We have been consulted for further management.     Overview/Hospital Course:  63 y/o F with history of Recurrent Adenocarcinoma of the large intestine and SBO in the past due to adhesions admitted on 01/12/2025 for partial small-bowel obstruction.  Presented with abdominal pain associated with nausea and vomiting.CT abd/pelvis with contrast showed large parastomal hernia  containing multiple loops of small bowel which appears to be resulting in at least partial small bowel  obstruction. NGT placed in the ED. General surgery consulted-recommend conservative management with NG suction, ivf hydration and serial abd exams. Clinically improving, clamp NGT on 1/24    Interval History:  No acute overnight events.  Patient remained afebrile.  no stool in her colostomy bag this AM or overnight.    Denies any abdominal pain, nausea, vomiting.  Surgery plans to clamp NGT today, possible removal later today     Review of Systems   Constitutional:  Negative for fever.   Respiratory:  Negative for cough and shortness of breath.    Gastrointestinal:  Negative for abdominal pain, nausea and vomiting.   Genitourinary:  Negative for difficulty urinating.   Neurological:  Negative for weakness.     Objective:     Vital Signs (Most Recent):  Temp: 98.4 °F (36.9 °C) (01/24/25 0700)  Pulse: 66 (01/24/25 0800)  Resp: 12 (01/24/25 0800)  BP: 132/64 (01/24/25 0800)  SpO2: 97 % (01/24/25 0800) Vital Signs (24h Range):  Temp:  [97.8 °F (36.6 °C)-98.4 °F (36.9 °C)] 98.4 °F (36.9 °C)  Pulse:  [60-75] 66  Resp:  [12-14] 12  SpO2:  [95 %-98 %] 97 %  BP: (108-132)/(56-67) 132/64     Weight: 60.4 kg (133 lb 2.5 oz)  Body mass index is 24.35 kg/m².    Intake/Output Summary (Last 24 hours) at 1/24/2025 1053  Last data filed at 1/24/2025 0944  Gross per 24 hour   Intake 656.96 ml   Output 350 ml   Net 306.96 ml         Physical Exam  Vitals and nursing note reviewed.   Constitutional:       General: She is not in acute distress.     Appearance: She is not ill-appearing.   HENT:      Mouth/Throat:      Comments: NGT in place  Cardiovascular:      Rate and Rhythm: Normal rate and regular rhythm.   Pulmonary:      Effort: Pulmonary effort is normal. No respiratory distress.      Breath sounds: Normal breath sounds.   Abdominal:      General: There is no distension.      Palpations: Abdomen is soft.      Tenderness: There is no abdominal tenderness.      Comments: Colostomy bag without stool   Musculoskeletal:      Right  lower leg: No edema.      Left lower leg: No edema.   Skin:     General: Skin is warm and dry.   Neurological:      General: No focal deficit present.      Mental Status: She is alert and oriented to person, place, and time.   Psychiatric:         Mood and Affect: Mood normal.         Thought Content: Thought content normal.             Significant Labs: All pertinent labs within the past 24 hours have been reviewed.    Significant Imaging: I have reviewed all pertinent imaging results/findings within the past 24 hours.    Assessment and Plan     * SBO (small bowel obstruction)  Due to recurrent parastomal hernia (recently had one repaired by Dr. Zavala at [facility] 3/2024).   NPO, NGT placed in the ED  general surgical consult placed: NGT clamped this morning, consider removal later today   Pain meds started and IVF.   F/u on lytes and replace as needed.   Monitor on tele with pulse ox for now.       Essential hypertension, benign  Patient's blood pressure range in the last 24 hours was: BP  Min: 108/56  Max: 132/64.The patient's inpatient anti-hypertensive regimen is listed below:  Current Antihypertensives  hydrALAZINE injection 5 mg, Every 6 hours PRN, Intravenous    Plan  - HOLD HCTZ for now given N/V and anticipated NPO status.   -Control Pain  -PRN hydralazine till able to resume home BP meds.   -BP controlled    History of colon cancer  S/p cassandra's with resection of recurrent adenocarcinoma of the large intestine in 11/2023 ([facility]). S/p post-op chemo which ended 1/2024. Did have radiation prior to surgery in 2023. F/u with GI and oncology as scheduled outpatient.       Gastroesophageal reflux disease  PPI daily       Hypokalemia  Patient's most recent potassium results are listed below.   Recent Labs     01/22/25  0609 01/23/25  0339 01/24/25  0421   K 3.7 3.3* 2.9*       Plan  - Replete potassium per protocol  - Monitor potassium Daily  - Patient's hypokalemia is worsening. Will replace as needed  - monitor  and replace as needed      VTE Risk Mitigation (From admission, onward)           Ordered     IP VTE LOW RISK PATIENT  Once         01/22/25 0111     Place sequential compression device  Until discontinued         01/22/25 0111                    Discharge Planning   MICHAEL:      Code Status: Full Code   Medical Readiness for Discharge Date:   Discharge Plan A: Home                      Gregor Humphrey DO  Department of Hospital Medicine   [facility] [place] [facility] Intensive Care

## 2025-01-25 VITALS
DIASTOLIC BLOOD PRESSURE: 75 MMHG | OXYGEN SATURATION: 96 % | BODY MASS INDEX: 24.51 KG/M2 | TEMPERATURE: 98 F | RESPIRATION RATE: 18 BRPM | HEART RATE: 71 BPM | HEIGHT: 62 IN | SYSTOLIC BLOOD PRESSURE: 160 MMHG | WEIGHT: 133.19 LBS

## 2025-01-25 LAB
ANION GAP SERPL CALC-SCNC: 11 MMOL/L (ref 8–16)
BASOPHILS # BLD AUTO: 0.02 K/UL (ref 0–0.2)
BASOPHILS NFR BLD: 0.3 % (ref 0–1.9)
BUN SERPL-MCNC: 9 MG/DL (ref 8–23)
CALCIUM SERPL-MCNC: 9.3 MG/DL (ref 8.7–10.5)
CHLORIDE SERPL-SCNC: 103 MMOL/L (ref 95–110)
CO2 SERPL-SCNC: 24 MMOL/L (ref 23–29)
CREAT SERPL-MCNC: 0.6 MG/DL (ref 0.5–1.4)
DIFFERENTIAL METHOD BLD: ABNORMAL
EOSINOPHIL # BLD AUTO: 0.1 K/UL (ref 0–0.5)
EOSINOPHIL NFR BLD: 1.8 % (ref 0–8)
ERYTHROCYTE [DISTWIDTH] IN BLOOD BY AUTOMATED COUNT: 13.1 % (ref 11.5–14.5)
EST. GFR  (NO RACE VARIABLE): >60 ML/MIN/1.73 M^2
GLUCOSE SERPL-MCNC: 78 MG/DL (ref 70–110)
HCT VFR BLD AUTO: 40.1 % (ref 37–48.5)
HGB BLD-MCNC: 13.2 G/DL (ref 12–16)
IMM GRANULOCYTES # BLD AUTO: 0.02 K/UL (ref 0–0.04)
IMM GRANULOCYTES NFR BLD AUTO: 0.3 % (ref 0–0.5)
LYMPHOCYTES # BLD AUTO: 1.8 K/UL (ref 1–4.8)
LYMPHOCYTES NFR BLD: 29.2 % (ref 18–48)
MAGNESIUM SERPL-MCNC: 1.8 MG/DL (ref 1.6–2.6)
MCH RBC QN AUTO: 27.7 PG (ref 27–31)
MCHC RBC AUTO-ENTMCNC: 32.9 G/DL (ref 32–36)
MCV RBC AUTO: 84 FL (ref 82–98)
MONOCYTES # BLD AUTO: 0.5 K/UL (ref 0.3–1)
MONOCYTES NFR BLD: 8.5 % (ref 4–15)
NEUTROPHILS # BLD AUTO: 3.6 K/UL (ref 1.8–7.7)
NEUTROPHILS NFR BLD: 59.9 % (ref 38–73)
NRBC BLD-RTO: 0 /100 WBC
PHOSPHATE SERPL-MCNC: 3.2 MG/DL (ref 2.7–4.5)
PLATELET # BLD AUTO: 257 K/UL (ref 150–450)
PMV BLD AUTO: 8.9 FL (ref 9.2–12.9)
POTASSIUM SERPL-SCNC: 3.3 MMOL/L (ref 3.5–5.1)
RBC # BLD AUTO: 4.77 M/UL (ref 4–5.4)
SODIUM SERPL-SCNC: 138 MMOL/L (ref 136–145)
WBC # BLD AUTO: 6 K/UL (ref 3.9–12.7)

## 2025-01-25 PROCEDURE — 36415 COLL VENOUS BLD VENIPUNCTURE: CPT | Performed by: STUDENT IN AN ORGANIZED HEALTH CARE EDUCATION/TRAINING PROGRAM

## 2025-01-25 PROCEDURE — 25000003 PHARM REV CODE 250: Performed by: STUDENT IN AN ORGANIZED HEALTH CARE EDUCATION/TRAINING PROGRAM

## 2025-01-25 PROCEDURE — 85025 COMPLETE CBC W/AUTO DIFF WBC: CPT

## 2025-01-25 PROCEDURE — 63600175 PHARM REV CODE 636 W HCPCS: Performed by: INTERNAL MEDICINE

## 2025-01-25 PROCEDURE — 99232 SBSQ HOSP IP/OBS MODERATE 35: CPT | Mod: ,,, | Performed by: STUDENT IN AN ORGANIZED HEALTH CARE EDUCATION/TRAINING PROGRAM

## 2025-01-25 PROCEDURE — 83735 ASSAY OF MAGNESIUM: CPT | Performed by: STUDENT IN AN ORGANIZED HEALTH CARE EDUCATION/TRAINING PROGRAM

## 2025-01-25 PROCEDURE — 80048 BASIC METABOLIC PNL TOTAL CA: CPT | Performed by: STUDENT IN AN ORGANIZED HEALTH CARE EDUCATION/TRAINING PROGRAM

## 2025-01-25 PROCEDURE — 94761 N-INVAS EAR/PLS OXIMETRY MLT: CPT

## 2025-01-25 PROCEDURE — 84100 ASSAY OF PHOSPHORUS: CPT | Performed by: STUDENT IN AN ORGANIZED HEALTH CARE EDUCATION/TRAINING PROGRAM

## 2025-01-25 RX ORDER — POTASSIUM CHLORIDE 20 MEQ/1
40 TABLET, EXTENDED RELEASE ORAL ONCE
Status: COMPLETED | OUTPATIENT
Start: 2025-01-25 | End: 2025-01-25

## 2025-01-25 RX ADMIN — POTASSIUM CHLORIDE 40 MEQ: 1500 TABLET, EXTENDED RELEASE ORAL at 10:01

## 2025-01-25 RX ADMIN — PANTOPRAZOLE SODIUM 40 MG: 40 INJECTION, POWDER, LYOPHILIZED, FOR SOLUTION INTRAVENOUS at 08:01

## 2025-01-25 RX ADMIN — MUPIROCIN: 20 OINTMENT TOPICAL at 08:01

## 2025-01-25 NOTE — DISCHARGE SUMMARY
[facility] Medicine  Discharge Summary      Patient Name: Lisa Do  MRN: [id number]  NGUYEN: [id number]  Patient Class: IP- Inpatient  Admission Date: 1/21/2025  Hospital Length of Stay: 3 days  Discharge Date and Time: 1/25/2025  1:00 PM  Attending Physician: Sara att. providers found   Discharging Provider: Gregor Humphrey DO  Primary Care Provider: Jose Echevarria MD    Primary Care Team: Networked reference to record PCT     HPI:   64 y.o. woman with h/o GERD, Recurrent Adenocarcinoma of the large intestine(s/p radiation prior to sigmoid colectomy with colostomy by Dr. Zavala at [facility] in 11/2022 followed by chemo which ended 1/2024), h/o SBO in the past due to adhesions (s/p JONY and repiar of parastomal hernia 3/2024) , Essential HTN  presents to the Er with c/o N/V x1 hours with associated abomdinal pain. No fevers or chills. NO chest pain or SOB/Najera. Was recently admitted to [facility] on 12/22/2024 with SBO which resolved with bowl rest and IVF.     Work up in the ER noted for WBC 11 and H/H 15/45. Lytes with normal renal and liver function. Lipase wnl.   UA not c/w acute UTI.     CT abd/pelvis with IV contrast:   1. Postsurgical changes of distal colon resection with left-sided colostomy.  Large parastomal hernia is seen containing multiple loops of small bowel which appears to be resulting in at least partial small bowel obstruction.   2. Additional mild wall thickening seen involving left lower quadrant small bowel loops could reflect short segment acute enteritis.     IV pain meds and IVF started. NGT placed by ER. We have been consulted for further management.     * No surgery found *      Hospital Course:   65 y/o F with history of Recurrent Adenocarcinoma of the large intestine and SBO in the past due to adhesions admitted on 01/12/2025 for partial small-bowel obstruction.  Presented with abdominal pain associated with nausea and vomiting.CT abd/pelvis with contrast showed  large parastomal hernia  containing multiple loops of small bowel which appears to be resulting in at least partial small bowel obstruction. NGT placed in the ED. General surgery consulted-recommend conservative management with NG suction, ivf hydration and serial abd exams. Clinically improving, NGT discontinued on 01/24 and diet was advanced.  Patient tolerated clear liquid diet and regular diet.  No longer having any abdominal pain, nausea, vomiting.  Having stools in her colostomy.    Pt denies any fever, chest pain, shortness of breath, palpitations, abdominal pain, nausea, vomiting, or any new weaknesses. Feels ready to go home. Patient's exam on discharge was as follow: Patient is alert and oriented, appears in no acute distress, heart with regular rate and rhythm, lungs clear to asculation with non-labored breathing, abdomen soft, nontender, stool seen in colostomy bag, and no new weaknesses or focal deficits seen. Bilateral lower extremities without any edema or calf tenderness.     Patient was counseled regarding any abnormal labs, differential diagnosis, treatment options, risk-benefit, lifestyle changes, prognosis, current condition, and medications. Patient was interactive and attentive.  Patient's questions were answered in a respectful and timely manner. Patient was instructed to follow-up with PCP within 1 week and to continue taking medications as prescribed.  Also, extensively discussed the risks, benefits, and side effects of patient's medications. Discussed with patient about any medication changes. Patient verbalized understanding and agrees to treatment plan.  Patient is stable for discharge.  Patient has no other questions or concerns at this time.  Also called and spoke with patient's daughter on the phone for the entire discussion.  ED precautions discussed with the patient.    Vital signs are stable. Ambulating without any difficulty. Tolerating p.o. intake without any nausea or vomiting.  Afebrile for over 24 hours. Patient is in stable condition and has no questions or concerns. Patient will be discharge to home once transportation secured . CM/SW to assist with discharge planning.     Vitals:    01/25/25 0015 01/25/25 0552 01/25/25 0828 01/25/25 1020   BP: 122/64 124/69 (!) 160/75    Pulse: 61 (!) 56 75 71   Resp: 14 16 19 18   Temp: 98 °F (36.7 °C) 98.2 °F (36.8 °C)     TempSrc:       SpO2: 97% 98%  96%   Weight:       Height:                Goals of Care Treatment Preferences:  Code Status: Full Code      SDOH Screening:  The patient was screened for utility difficulties, food insecurity, transport difficulties, housing insecurity, and interpersonal safety and there were no concerns identified this admission.     Consults:   Consults (From admission, onward)          Status Ordering Provider     Inpatient consult to General Surgery  Once        Provider:  Deion Montalvo MD    Completed MARVA RAMÍREZ            * SBO (small bowel obstruction)  Due to recurrent parastomal hernia (recently had one repaired by Dr. Zavala at [facility] 3/2024).   NPO, NGT placed in the ED  general surgical consult placed: NGT clamped this morning, consider removal later today   Pain meds started and IVF.   F/u on lytes and replace as needed.   Monitor on tele with pulse ox for now.       Essential hypertension, benign  Patient's blood pressure range in the last 24 hours was: BP  Min: 108/56  Max: 132/64.The patient's inpatient anti-hypertensive regimen is listed below:  Current Antihypertensives  hydrALAZINE injection 5 mg, Every 6 hours PRN, Intravenous    Plan  - HOLD HCTZ for now given N/V and anticipated NPO status.   -Control Pain  -PRN hydralazine till able to resume home BP meds.   -BP controlled    History of colon cancer  S/p cassandra's with resection of recurrent adenocarcinoma of the large intestine in 11/2023 ([facility]). S/p post-op chemo which ended 1/2024. Did have radiation prior to surgery in 2023. F/u  with GI and oncology as scheduled outpatient.       Gastroesophageal reflux disease  PPI daily       Hypokalemia  Patient's most recent potassium results are listed below.   Recent Labs     01/22/25  0609 01/23/25  0339 01/24/25  0421   K 3.7 3.3* 2.9*       Plan  - Replete potassium per protocol  - Monitor potassium Daily  - Patient's hypokalemia is worsening. Will replace as needed  - monitor and replace as needed      Final Active Diagnoses:    Diagnosis Date Noted POA    PRINCIPAL PROBLEM:  SBO (small bowel obstruction) [K56.609] 01/22/2025 Yes    Essential hypertension, benign [I10] 04/26/2016 Yes    History of colon cancer [Z85.038] 04/24/2015 Yes    Gastroesophageal reflux disease [K21.9] 04/26/2016 Yes    Hypokalemia [E87.6] 01/23/2025 No      Problems Resolved During this Admission:       Discharged Condition: stable    Disposition: Home or Self Care    Follow Up:   Follow-up Information       Jose Echevarria MD. Schedule an appointment as soon as possible for a visit in 1 week(s).    Specialty: Family Medicine  Why: [facility] Follow-up needed in 1 week  Contact information:  [address]  [address]  [place] [id number] [phone]                           Patient Instructions:      Diet Cardiac     Notify your health care provider if you experience any of the following:  temperature >100.4     Notify your health care provider if you experience any of the following:  persistent nausea and vomiting or diarrhea     Notify your health care provider if you experience any of the following:  severe uncontrolled pain     Notify your health care provider if you experience any of the following:  increased confusion or weakness     Notify your health care provider if you experience any of the following:  persistent dizziness, light-headedness, or visual disturbances     Activity as tolerated       Significant Diagnostic Studies: Labs: All labs within the past 24 hours have been  reviewed      Recent Results (from the past 100 hours)   CBC W/ AUTO DIFFERENTIAL    Collection Time: 01/21/25 11:17 PM   Result Value Ref Range    WBC 11.25 3.90 - 12.70 K/uL    RBC 5.44 (H) 4.00 - 5.40 M/uL    Hemoglobin 15.2 12.0 - 16.0 g/dL    Hematocrit 45.8 37.0 - 48.5 %    MCV 84 82 - 98 fL    MCH 27.9 27.0 - 31.0 pg    MCHC 33.2 32.0 - 36.0 g/dL    RDW 13.6 11.5 - 14.5 %    Platelets 325 150 - 450 K/uL    MPV 8.8 (L) 9.2 - 12.9 fL    Immature Granulocytes 0.4 0.0 - 0.5 %    Gran # (ANC) 9.2 (H) 1.8 - 7.7 K/uL    Immature Grans (Abs) 0.05 (H) 0.00 - 0.04 K/uL    Lymph # 1.3 1.0 - 4.8 K/uL    Mono # 0.7 0.3 - 1.0 K/uL    Eos # 0.0 0.0 - 0.5 K/uL    Baso # 0.05 0.00 - 0.20 K/uL    nRBC 0 0 /100 WBC    Gran % 81.4 (H) 38.0 - 73.0 %    Lymph % 11.3 (L) 18.0 - 48.0 %    Mono % 6.1 4.0 - 15.0 %    Eosinophil % 0.4 0.0 - 8.0 %    Basophil % 0.4 0.0 - 1.9 %    Differential Method Automated    Comp. Metabolic Panel    Collection Time: 01/21/25 11:17 PM   Result Value Ref Range    Sodium 140 136 - 145 mmol/L    Potassium 3.5 3.5 - 5.1 mmol/L    Chloride 101 95 - 110 mmol/L    CO2 25 23 - 29 mmol/L    Glucose 115 (H) 70 - 110 mg/dL    BUN 14 8 - 23 mg/dL    Creatinine 0.7 0.5 - 1.4 mg/dL    Calcium 10.1 8.7 - 10.5 mg/dL    Total Protein 8.6 (H) 6.0 - 8.4 g/dL    Albumin 4.4 3.5 - 5.2 g/dL    Total Bilirubin 0.5 0.1 - 1.0 mg/dL    Alkaline Phosphatase 145 40 - 150 U/L    AST 24 10 - 40 U/L    ALT 17 10 - 44 U/L    eGFR >60 >60 mL/min/1.73 m^2    Anion Gap 14 8 - 16 mmol/L   Lipase    Collection Time: 01/21/25 11:17 PM   Result Value Ref Range    Lipase 31 4 - 60 U/L   Lactic acid, plasma    Collection Time: 01/21/25 11:17 PM   Result Value Ref Range    Lactate (Lactic Acid) 1.5 0.5 - 2.2 mmol/L   Magnesium    Collection Time: 01/21/25 11:17 PM   Result Value Ref Range    Magnesium 2.0 1.6 - 2.6 mg/dL   Urinalysis, Reflex to Urine Culture Urine, Clean Catch    Collection Time: 01/22/25 12:11 AM    Specimen: Urine   Result  Value Ref Range    Specimen UA Urine, Clean Catch     Color, UA Colorless (A) Yellow, Straw, Candi    Appearance, UA Clear Clear    pH, UA 7.0 5.0 - 8.0    Specific Gravity, UA 1.010 1.005 - 1.030    Protein, UA Negative Negative    Glucose, UA Negative Negative    Ketones, UA Negative Negative    Bilirubin (UA) Negative Negative    Occult Blood UA Negative Negative    Nitrite, UA Negative Negative    Urobilinogen, UA Negative <2.0 EU/dL    Leukocytes, UA Negative Negative   Basic metabolic panel    Collection Time: 01/22/25  6:09 AM   Result Value Ref Range    Sodium 142 136 - 145 mmol/L    Potassium 3.7 3.5 - 5.1 mmol/L    Chloride 107 95 - 110 mmol/L    CO2 21 (L) 23 - 29 mmol/L    Glucose 129 (H) 70 - 110 mg/dL    BUN 12 8 - 23 mg/dL    Creatinine 0.6 0.5 - 1.4 mg/dL    Calcium 9.2 8.7 - 10.5 mg/dL    Anion Gap 14 8 - 16 mmol/L    eGFR >60 >60 mL/min/1.73 m^2   Protime-INR    Collection Time: 01/22/25  6:09 AM   Result Value Ref Range    Prothrombin Time 10.5 9.0 - 12.5 sec    INR 0.9 0.8 - 1.2   CBC Auto Differential    Collection Time: 01/22/25  6:09 AM   Result Value Ref Range    WBC 13.14 (H) 3.90 - 12.70 K/uL    RBC 5.08 4.00 - 5.40 M/uL    Hemoglobin 14.2 12.0 - 16.0 g/dL    Hematocrit 43.6 37.0 - 48.5 %    MCV 86 82 - 98 fL    MCH 28.0 27.0 - 31.0 pg    MCHC 32.6 32.0 - 36.0 g/dL    RDW 13.6 11.5 - 14.5 %    Platelets 290 150 - 450 K/uL    MPV 8.7 (L) 9.2 - 12.9 fL    Immature Granulocytes 0.2 0.0 - 0.5 %    Gran # (ANC) 11.7 (H) 1.8 - 7.7 K/uL    Immature Grans (Abs) 0.02 0.00 - 0.04 K/uL    Lymph # 0.7 (L) 1.0 - 4.8 K/uL    Mono # 0.6 0.3 - 1.0 K/uL    Eos # 0.0 0.0 - 0.5 K/uL    Baso # 0.03 0.00 - 0.20 K/uL    nRBC 0 0 /100 WBC    Gran % 89.4 (H) 38.0 - 73.0 %    Lymph % 5.6 (L) 18.0 - 48.0 %    Mono % 4.6 4.0 - 15.0 %    Eosinophil % 0.0 0.0 - 8.0 %    Basophil % 0.2 0.0 - 1.9 %    Differential Method Automated    Phosphorus    Collection Time: 01/22/25  6:09 AM   Result Value Ref Range    Phosphorus  3.0 2.7 - 4.5 mg/dL   CBC Auto Differential    Collection Time: 01/23/25  3:39 AM   Result Value Ref Range    WBC 6.45 3.90 - 12.70 K/uL    RBC 4.35 4.00 - 5.40 M/uL    Hemoglobin 11.9 (L) 12.0 - 16.0 g/dL    Hematocrit 37.5 37.0 - 48.5 %    MCV 86 82 - 98 fL    MCH 27.4 27.0 - 31.0 pg    MCHC 31.7 (L) 32.0 - 36.0 g/dL    RDW 13.9 11.5 - 14.5 %    Platelets 241 150 - 450 K/uL    MPV 9.1 (L) 9.2 - 12.9 fL    Immature Granulocytes 0.3 0.0 - 0.5 %    Gran # (ANC) 4.2 1.8 - 7.7 K/uL    Immature Grans (Abs) 0.02 0.00 - 0.04 K/uL    Lymph # 1.5 1.0 - 4.8 K/uL    Mono # 0.6 0.3 - 1.0 K/uL    Eos # 0.1 0.0 - 0.5 K/uL    Baso # 0.02 0.00 - 0.20 K/uL    nRBC 0 0 /100 WBC    Gran % 65.5 38.0 - 73.0 %    Lymph % 23.4 18.0 - 48.0 %    Mono % 8.5 4.0 - 15.0 %    Eosinophil % 2.0 0.0 - 8.0 %    Basophil % 0.3 0.0 - 1.9 %    Differential Method Automated    Basic Metabolic Panel    Collection Time: 01/23/25  3:39 AM   Result Value Ref Range    Sodium 143 136 - 145 mmol/L    Potassium 3.3 (L) 3.5 - 5.1 mmol/L    Chloride 109 95 - 110 mmol/L    CO2 24 23 - 29 mmol/L    Glucose 95 70 - 110 mg/dL    BUN 17 8 - 23 mg/dL    Creatinine 0.6 0.5 - 1.4 mg/dL    Calcium 9.2 8.7 - 10.5 mg/dL    Anion Gap 10 8 - 16 mmol/L    eGFR >60 >60 mL/min/1.73 m^2   Magnesium    Collection Time: 01/23/25  3:39 AM   Result Value Ref Range    Magnesium 2.0 1.6 - 2.6 mg/dL   Phosphorus    Collection Time: 01/23/25  3:39 AM   Result Value Ref Range    Phosphorus 3.0 2.7 - 4.5 mg/dL   Basic Metabolic Panel    Collection Time: 01/24/25  4:21 AM   Result Value Ref Range    Sodium 140 136 - 145 mmol/L    Potassium 2.9 (L) 3.5 - 5.1 mmol/L    Chloride 105 95 - 110 mmol/L    CO2 20 (L) 23 - 29 mmol/L    Glucose 60 (L) 70 - 110 mg/dL    BUN 18 8 - 23 mg/dL    Creatinine 0.6 0.5 - 1.4 mg/dL    Calcium 9.2 8.7 - 10.5 mg/dL    Anion Gap 15 8 - 16 mmol/L    eGFR >60 >60 mL/min/1.73 m^2   Magnesium    Collection Time: 01/24/25  4:21 AM   Result Value Ref Range     Magnesium 1.9 1.6 - 2.6 mg/dL   Phosphorus    Collection Time: 01/24/25  4:21 AM   Result Value Ref Range    Phosphorus 3.5 2.7 - 4.5 mg/dL   Potassium    Collection Time: 01/24/25  5:14 PM   Result Value Ref Range    Potassium 3.4 (L) 3.5 - 5.1 mmol/L   Basic Metabolic Panel    Collection Time: 01/25/25  4:06 AM   Result Value Ref Range    Sodium 138 136 - 145 mmol/L    Potassium 3.3 (L) 3.5 - 5.1 mmol/L    Chloride 103 95 - 110 mmol/L    CO2 24 23 - 29 mmol/L    Glucose 78 70 - 110 mg/dL    BUN 9 8 - 23 mg/dL    Creatinine 0.6 0.5 - 1.4 mg/dL    Calcium 9.3 8.7 - 10.5 mg/dL    Anion Gap 11 8 - 16 mmol/L    eGFR >60 >60 mL/min/1.73 m^2   Magnesium    Collection Time: 01/25/25  4:06 AM   Result Value Ref Range    Magnesium 1.8 1.6 - 2.6 mg/dL   Phosphorus    Collection Time: 01/25/25  4:06 AM   Result Value Ref Range    Phosphorus 3.2 2.7 - 4.5 mg/dL   CBC auto differential    Collection Time: 01/25/25  4:06 AM   Result Value Ref Range    WBC 6.00 3.90 - 12.70 K/uL    RBC 4.77 4.00 - 5.40 M/uL    Hemoglobin 13.2 12.0 - 16.0 g/dL    Hematocrit 40.1 37.0 - 48.5 %    MCV 84 82 - 98 fL    MCH 27.7 27.0 - 31.0 pg    MCHC 32.9 32.0 - 36.0 g/dL    RDW 13.1 11.5 - 14.5 %    Platelets 257 150 - 450 K/uL    MPV 8.9 (L) 9.2 - 12.9 fL    Immature Granulocytes 0.3 0.0 - 0.5 %    Gran # (ANC) 3.6 1.8 - 7.7 K/uL    Immature Grans (Abs) 0.02 0.00 - 0.04 K/uL    Lymph # 1.8 1.0 - 4.8 K/uL    Mono # 0.5 0.3 - 1.0 K/uL    Eos # 0.1 0.0 - 0.5 K/uL    Baso # 0.02 0.00 - 0.20 K/uL    nRBC 0 0 /100 WBC    Gran % 59.9 38.0 - 73.0 %    Lymph % 29.2 18.0 - 48.0 %    Mono % 8.5 4.0 - 15.0 %    Eosinophil % 1.8 0.0 - 8.0 %    Basophil % 0.3 0.0 - 1.9 %    Differential Method Automated        Microbiology Results (last 7 days)       ** No results found for the last 168 hours. **            Imaging Results              XR NG/OG tube placement check, non-radiologist performed (Final result)  Result time 01/22/25 03:01:06   Procedure changed from  XR Gastric tube check, non-radiologist performed     Final result by Shane Rae MD (01/22/25 03:01:06)                   Impression:      Enteric tube tip in the stomach.      Electronically signed by: Shane Rae MD  Date:    01/22/2025  Time:    03:01               Narrative:    EXAMINATION:  XR NG/OG TUBE PLACEMENT CHECK, NON-RADIOLOGIST PERFORMED    CLINICAL HISTORY:  NGT placement;    TECHNIQUE:  Single AP View of the abdomen was performed centered over the diaphragm for tube placement.    COMPARISON:  01/21/2025.    FINDINGS:  Enteric tube tip in the stomach.    The visualized bowel gas pattern is nonobstructed.    Some residual contrast in the renal collecting systems from recent CT exam.                                       CT Abdomen Pelvis With IV Contrast NO Oral Contrast (Final result)  Result time 01/22/25 00:56:01      Final result by Yeimy Orozco MD (01/22/25 00:56:01)                   Impression:      1. Postsurgical changes of distal colon resection with left-sided colostomy.  Large parastomal hernia is seen containing multiple loops of small bowel which appears to be resulting in at least partial small bowel obstruction.  2. Additional mild wall thickening seen involving left lower quadrant small bowel loops could reflect short segment acute enteritis.  3. Additional findings as detailed above.      Electronically signed by: Yeimy Orozco MD  Date:    01/22/2025  Time:    00:56               Narrative:    EXAMINATION:  CT ABDOMEN PELVIS WITH IV CONTRAST    CLINICAL HISTORY:  Abdominal abscess/infection suspected;    TECHNIQUE:  Low dose axial images, sagittal and coronal reformations were obtained from the lung bases to the pubic symphysis following the IV administration of 75 mL of Omnipaque 350 .  Oral contrast was not given.    COMPARISON:  None.    FINDINGS:  The visualized portion of the heart is unremarkable.  The lung bases are clear.    No significant hepatic  abnormalities are identified.  There is no intra-or extrahepatic biliary ductal dilatation.  The gallbladder is unremarkable.  The pancreas, spleen, and adrenal glands are unremarkable.    Kidneys enhance normally with no evidence of hydronephrosis.  No abnormalities are seen along the ureteral courses.  Urinary bladder is unremarkable.  Uterus has been removed.    Postsurgical changes of distal colon resection are seen with left-sided colostomy.  There is a large parastomal hernia containing portion of the left colon and multiple loops of small bowel.  This appears to be resulting in obstruction.  Small bowel loops are dilated proximal from this level measuring upwards of 4 cm in caliber with fluid distention and small bowel feces sign seen.  There is short segment wall thickening involving the small bowel loops in the left lower quadrant outside of the hernia which could reflect short segment region of acute enteritis.  Stomach is distended with fluid and ingested material.  Appendix is not definitely visualized.  No free air or free fluid.    Aorta tapers normally.    No acute osseous abnormality identified.  No few stable vertebral body mixed sclerotic/lucent lesions are seen possibly representing hemangiomas or atypical hemangiomas.  This is most pronounced involving the L2 vertebral body.  Findings are stable since at least 2017 CT.                                       X-Ray Abdomen Flat And Erect (Final result)  Result time 01/22/25 00:06:13      Final result by Yeimy Orozco MD (01/22/25 00:06:13)                   Impression:      Nonobstructive bowel gas pattern.      Electronically signed by: Yeimy Orozco MD  Date:    01/22/2025  Time:    00:06               Narrative:    EXAMINATION:  XR ABDOMEN FLAT AND ERECT    CLINICAL HISTORY:  Unspecified intestinal obstruction, unspecified as to partial versus complete obstruction    TECHNIQUE:  Flat and erect AP views of the abdomen were  performed.    COMPARISON:  None    FINDINGS:  Nonspecific bowel gas pattern.  No evidence to suggest obstruction.  No free air identified.  Moderate volume retained stool is seen in the colon.  Clips are seen over the lower abdomen and pelvic region.  Partially visualized lung bases are clear.                                         Pending Diagnostic Studies:       None           Medications:  Reconciled Home Medications:      Medication List        CONTINUE taking these medications      cetirizine 10 MG tablet  Commonly known as: ZYRTEC  Take 10 mg by mouth once daily.     hydroCHLOROthiazide 25 MG tablet  Commonly known as: HYDRODIURIL  Take 25 mg by mouth once daily.     pantoprazole 40 MG tablet  Commonly known as: PROTONIX  Take 1 tablet (40 mg total) by mouth once daily.            STOP taking these medications      doxycycline 100 MG capsule  Commonly known as: MONODOX              Indwelling Lines/Drains at time of discharge:   Lines/Drains/Airways       None                   Time spent on the discharge of patient: Greater than 35   minutes        Gregor Humphrey DO  Department of Hospital Medicine  [facility] [place] - Intensive Care

## 2025-01-25 NOTE — NURSING
[facility] - [place]  Nurses Note -- 4 Eyes      1/25/2025       Skin assessed on: Q Shift      [x] No Pressure Injuries Present    [x]Prevention Measures Documented    [] Yes LDA  for Pressure Injury Previously documented     [] Yes New Pressure Injury Discovered   [] LDA for New Pressure Injury Added      Attending RN:  Vishal Diaz RN     Second RN:  DRE Simpson RN

## 2025-01-25 NOTE — PROGRESS NOTES
64 F with SBO    SHARYN. Tolerating diet, having bowel function. NO nausea.     Vitals:    01/25/25 0552   BP: 124/69   Pulse: (!) 56   Resp: 16   Temp: 98.2 °F (36.8 °C)     NAD AAOx3  Abdomen soft, nontender   Ostomy output     Labs reviewed - WBC normal     A/P - resolved SBO  Please reconsult if needed but she appears to be doing great - we will sign off

## 2025-01-25 NOTE — NURSING
Pt tolerated breakfast and lunch without nausea. AVS received and discharge teaching done. PIV removed. Cardiac monitor discontinued. Pt's spouse at bedside, supportive of patient. General surgery signed off this AM.

## 2025-01-27 ENCOUNTER — TELEPHONE (OUTPATIENT)
Dept: SURGERY | Facility: HOSPITAL | Age: 65
End: 2025-01-27
Payer: COMMERCIAL

## 2025-01-27 NOTE — TELEPHONE ENCOUNTER
----- Message from Dianna sent at 1/27/2025  4:48 PM CST -----  Regarding: Reschedule Request  RESCHEDULE/APPTS    Current Appt Date: 01/28/2025    Type of Appt: NP     Physician: Neetu     Reason for Scheduling: Pt states that she spoke to someone about getting an appt with Neetu Ashton but this appt was made without any confirmation to the pt. The last minute appt they can not attend due to arrangements that would have needed to be made. They would like a call back as soon as possible to get rescheduled in and confirmation on the appt verbally.     Caller: Lisa LOPEZ     Contact Preference: [phone] (work)

## 2025-01-27 NOTE — TELEPHONE ENCOUNTER
S/w daughter and she wanted to see ashley about her mother's parastomal hernia, goes to , was just there last week with another bowel obs,   Ct showed loops of bowel in hernia, explained to daughter that ashley could she her about the colostomy stoma but she does not fix hernias, daughter understood but wants to try to keep appt for tomorrow, working on getting out of work and transportation, mother does not speak english, offered her an appt with a surgeon to discuss repair but she wanted to talk to her family first, sees Dr. Zavala at

## 2025-01-28 ENCOUNTER — TELEPHONE (OUTPATIENT)
Dept: SURGERY | Facility: CLINIC | Age: 65
End: 2025-01-28
Payer: COMMERCIAL

## 2025-01-28 NOTE — TELEPHONE ENCOUNTER
RN called pt's daughter to get pt rescheduled for an appt with Mrs. De Anda.  Pt currently has a hernia that needs attention, so pt's daughter stated that they are seeking an appt with pt's original surgeon first, and if they do not have any success, they will call back to make an appt to see Mrs. De Anda on 2/5.  Pt's daughter verbalized understanding to all.